# Patient Record
Sex: FEMALE | Race: WHITE | NOT HISPANIC OR LATINO | Employment: PART TIME | URBAN - METROPOLITAN AREA
[De-identification: names, ages, dates, MRNs, and addresses within clinical notes are randomized per-mention and may not be internally consistent; named-entity substitution may affect disease eponyms.]

---

## 2018-12-18 ENCOUNTER — HOSPITAL ENCOUNTER (EMERGENCY)
Facility: HOSPITAL | Age: 63
Discharge: HOME | End: 2018-12-19
Attending: EMERGENCY MEDICINE

## 2018-12-18 DIAGNOSIS — R55 SYNCOPE: ICD-10-CM

## 2018-12-18 DIAGNOSIS — S01.01XA LACERATION OF SCALP, INITIAL ENCOUNTER: Primary | ICD-10-CM

## 2018-12-18 LAB
ALBUMIN SERPL BCP-MCNC: 3.7 G/DL
ALP SERPL-CCNC: 96 U/L
ALT SERPL W/O P-5'-P-CCNC: 29 U/L
ANION GAP SERPL CALC-SCNC: 11 MMOL/L
AST SERPL-CCNC: 29 U/L
BASOPHILS # BLD AUTO: 0.05 K/UL
BASOPHILS NFR BLD: 0.8 %
BILIRUB SERPL-MCNC: 0.2 MG/DL
BUN SERPL-MCNC: 15 MG/DL
CALCIUM SERPL-MCNC: 9.6 MG/DL
CHLORIDE SERPL-SCNC: 109 MMOL/L
CO2 SERPL-SCNC: 22 MMOL/L
CREAT SERPL-MCNC: 0.7 MG/DL
DIFFERENTIAL METHOD: ABNORMAL
EOSINOPHIL # BLD AUTO: 0.1 K/UL
EOSINOPHIL NFR BLD: 1.3 %
ERYTHROCYTE [DISTWIDTH] IN BLOOD BY AUTOMATED COUNT: 14.8 %
EST. GFR  (AFRICAN AMERICAN): >60 ML/MIN/1.73 M^2
EST. GFR  (NON AFRICAN AMERICAN): >60 ML/MIN/1.73 M^2
GLUCOSE SERPL-MCNC: 107 MG/DL
HCT VFR BLD AUTO: 42.5 %
HGB BLD-MCNC: 13.3 G/DL
IMM GRANULOCYTES # BLD AUTO: 0.01 K/UL
IMM GRANULOCYTES NFR BLD AUTO: 0.2 %
LYMPHOCYTES # BLD AUTO: 1.9 K/UL
LYMPHOCYTES NFR BLD: 32.3 %
MCH RBC QN AUTO: 28.1 PG
MCHC RBC AUTO-ENTMCNC: 31.3 G/DL
MCV RBC AUTO: 90 FL
MONOCYTES # BLD AUTO: 0.4 K/UL
MONOCYTES NFR BLD: 7.3 %
NEUTROPHILS # BLD AUTO: 3.5 K/UL
NEUTROPHILS NFR BLD: 58.1 %
NRBC BLD-RTO: 0 /100 WBC
PLATELET # BLD AUTO: 294 K/UL
PMV BLD AUTO: 9.4 FL
POTASSIUM SERPL-SCNC: 4.1 MMOL/L
PROT SERPL-MCNC: 7.3 G/DL
RBC # BLD AUTO: 4.73 M/UL
SODIUM SERPL-SCNC: 142 MMOL/L
WBC # BLD AUTO: 6 K/UL

## 2018-12-18 PROCEDURE — 90715 TDAP VACCINE 7 YRS/> IM: CPT | Performed by: EMERGENCY MEDICINE

## 2018-12-18 PROCEDURE — 85025 COMPLETE CBC W/AUTO DIFF WBC: CPT

## 2018-12-18 PROCEDURE — 63600175 PHARM REV CODE 636 W HCPCS: Performed by: EMERGENCY MEDICINE

## 2018-12-18 PROCEDURE — 12031 INTMD RPR S/A/T/EXT 2.5 CM/<: CPT

## 2018-12-18 PROCEDURE — 12001 RPR S/N/AX/GEN/TRNK 2.5CM/<: CPT

## 2018-12-18 PROCEDURE — 12001 RPR S/N/AX/GEN/TRNK 2.5CM/<: CPT | Mod: ,,, | Performed by: EMERGENCY MEDICINE

## 2018-12-18 PROCEDURE — 93005 ELECTROCARDIOGRAM TRACING: CPT

## 2018-12-18 PROCEDURE — 90471 IMMUNIZATION ADMIN: CPT | Performed by: EMERGENCY MEDICINE

## 2018-12-18 PROCEDURE — 99284 EMERGENCY DEPT VISIT MOD MDM: CPT | Mod: 25,,, | Performed by: EMERGENCY MEDICINE

## 2018-12-18 PROCEDURE — 25000003 PHARM REV CODE 250: Performed by: EMERGENCY MEDICINE

## 2018-12-18 PROCEDURE — 99285 EMERGENCY DEPT VISIT HI MDM: CPT | Mod: 25

## 2018-12-18 PROCEDURE — 93010 ELECTROCARDIOGRAM REPORT: CPT | Mod: ,,, | Performed by: INTERNAL MEDICINE

## 2018-12-18 PROCEDURE — 80053 COMPREHEN METABOLIC PANEL: CPT

## 2018-12-18 RX ORDER — BUPROPION HYDROCHLORIDE 150 MG/1
150 TABLET ORAL DAILY
COMMUNITY
End: 2024-03-18 | Stop reason: ALTCHOICE

## 2018-12-18 RX ORDER — LIDOCAINE HYDROCHLORIDE AND EPINEPHRINE 10; 10 MG/ML; UG/ML
5 INJECTION, SOLUTION INFILTRATION; PERINEURAL ONCE
Status: COMPLETED | OUTPATIENT
Start: 2018-12-18 | End: 2018-12-18

## 2018-12-18 RX ORDER — BACITRACIN ZINC 500 UNIT/G
OINTMENT IN PACKET (EA) TOPICAL
Status: COMPLETED | OUTPATIENT
Start: 2018-12-18 | End: 2018-12-18

## 2018-12-18 RX ORDER — LITHIUM CARBONATE 300 MG/1
300 CAPSULE ORAL
COMMUNITY
End: 2024-03-18 | Stop reason: SDUPTHER

## 2018-12-18 RX ADMIN — BACITRACIN ZINC: 500 OINTMENT TOPICAL at 11:12

## 2018-12-18 RX ADMIN — LIDOCAINE HYDROCHLORIDE,EPINEPHRINE BITARTRATE 5 ML: 10; .01 INJECTION, SOLUTION INFILTRATION; PERINEURAL at 11:12

## 2018-12-18 RX ADMIN — CLOSTRIDIUM TETANI TOXOID ANTIGEN (FORMALDEHYDE INACTIVATED), CORYNEBACTERIUM DIPHTHERIAE TOXOID ANTIGEN (FORMALDEHYDE INACTIVATED), BORDETELLA PERTUSSIS TOXOID ANTIGEN (GLUTARALDEHYDE INACTIVATED), BORDETELLA PERTUSSIS FILAMENTOUS HEMAGGLUTININ ANTIGEN (FORMALDEHYDE INACTIVATED), BORDETELLA PERTUSSIS PERTACTIN ANTIGEN, AND BORDETELLA PERTUSSIS FIMBRIAE 2/3 ANTIGEN 0.5 ML: 5; 2; 2.5; 5; 3; 5 INJECTION, SUSPENSION INTRAMUSCULAR at 11:12

## 2018-12-19 VITALS
TEMPERATURE: 98 F | SYSTOLIC BLOOD PRESSURE: 137 MMHG | BODY MASS INDEX: 19.66 KG/M2 | DIASTOLIC BLOOD PRESSURE: 83 MMHG | OXYGEN SATURATION: 99 % | RESPIRATION RATE: 18 BRPM | WEIGHT: 118 LBS | HEIGHT: 65 IN | HEART RATE: 100 BPM
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
## 2018-12-19 NOTE — ED TRIAGE NOTES
Roselia Mendez, a 63 y.o. female presents to the ED w/ complaint of reports syncopal episode. Pt reports landing and hitting the back of her head. Pt reports she's unsure of LOC and doesn't remember exactly what happened. Pt denies blood thinner use, changes in vision, CP, SOB.    Triage note:  Chief Complaint   Patient presents with    Fall     pt reports syncopal episode tonight, pt hit back of head on ground, dry blood and hematoma noted to back of head, pt states that she doesn't remember what happen,denies taking blood thinners      Review of patient's allergies indicates:   Allergen Reactions    Promethazine      No past medical history on file.

## 2018-12-19 NOTE — ED NOTES
Patient identifiers verified and correct.  LOC: The patient is awake, alert and aware of environment with an appropriate affect, the patient is oriented x 3 and speaking appropriately.   APPEARANCE: Patient appears comfortable and in no acute distress, patient is clean and well groomed.  SKIN: The skin is warm and dry, color consistent with ethnicity, patient has normal skin turgor and WDL. Small Lac with dried blood noted to the back of the head r/t fall.   MUSCULOSKELETAL: Patient moving all extremities spontaneously, no swelling noted.  RESPIRATORY: Airway is open and patent, respirations are spontaneous, patient has a normal effort and rate, no accessory muscle use noted.  CARDIAC: Pt has normal R&R, cap refill <3 sec.  ABDOMEN: Pt denies any changes in BM. Abd WDL.  : Pt denies frequency or burning with urination.  NEURO: PERRL, opens eyes spontaneously, equal bilateral hand strength, follows commands, equal facial symmetry, normal sensation in all extremities when touched with a finger.

## 2018-12-19 NOTE — PROVIDER PROGRESS NOTES - EMERGENCY DEPT.
Encounter Date: 12/18/2018    ED Physician Progress Notes       SCRIBE NOTE: IJose, am scribing for, and in the presence of,  Ancelmo Lo MD.  Physician Statement: IAncelmo MD, personally performed the services described in this documentation as scribed by Jose Sharp in my presence, and it is both accurate and complete.      EKG - STEMI Decision  Initial Reading: No STEMI present.

## 2023-07-24 ENCOUNTER — HOSPITAL ENCOUNTER (EMERGENCY)
Facility: HOSPITAL | Age: 68
Discharge: HOME | End: 2023-07-24
Attending: EMERGENCY MEDICINE
Payer: MEDICARE

## 2023-07-24 VITALS
HEIGHT: 65 IN | DIASTOLIC BLOOD PRESSURE: 71 MMHG | SYSTOLIC BLOOD PRESSURE: 134 MMHG | TEMPERATURE: 98 F | BODY MASS INDEX: 19.66 KG/M2 | WEIGHT: 118 LBS | OXYGEN SATURATION: 98 % | RESPIRATION RATE: 16 BRPM | HEART RATE: 73 BPM

## 2023-07-24 DIAGNOSIS — R10.9 RIGHT FLANK PAIN: Primary | ICD-10-CM

## 2023-07-24 LAB
ALBUMIN SERPL BCP-MCNC: 3.6 G/DL (ref 3.5–5.2)
ALP SERPL-CCNC: 92 U/L (ref 55–135)
ALT SERPL W/O P-5'-P-CCNC: 26 U/L (ref 10–44)
ANION GAP SERPL CALC-SCNC: 11 MMOL/L (ref 8–16)
AST SERPL-CCNC: 19 U/L (ref 10–40)
BASOPHILS # BLD AUTO: 0.05 K/UL (ref 0–0.2)
BASOPHILS NFR BLD: 0.7 % (ref 0–1.9)
BILIRUB SERPL-MCNC: 0.4 MG/DL (ref 0.1–1)
BILIRUB UR QL STRIP: NEGATIVE
BUN SERPL-MCNC: 11 MG/DL (ref 8–23)
CALCIUM SERPL-MCNC: 9.9 MG/DL (ref 8.7–10.5)
CHLORIDE SERPL-SCNC: 106 MMOL/L (ref 95–110)
CLARITY UR REFRACT.AUTO: CLEAR
CO2 SERPL-SCNC: 25 MMOL/L (ref 23–29)
COLOR UR AUTO: YELLOW
CREAT SERPL-MCNC: 0.6 MG/DL (ref 0.5–1.4)
DIFFERENTIAL METHOD: NORMAL
EOSINOPHIL # BLD AUTO: 0.2 K/UL (ref 0–0.5)
EOSINOPHIL NFR BLD: 2.6 % (ref 0–8)
ERYTHROCYTE [DISTWIDTH] IN BLOOD BY AUTOMATED COUNT: 12.4 % (ref 11.5–14.5)
EST. GFR  (NO RACE VARIABLE): >60 ML/MIN/1.73 M^2
GLUCOSE SERPL-MCNC: 95 MG/DL (ref 70–110)
GLUCOSE UR QL STRIP: NEGATIVE
HCT VFR BLD AUTO: 42.3 % (ref 37–48.5)
HCV AB SERPL QL IA: NORMAL
HGB BLD-MCNC: 13.9 G/DL (ref 12–16)
HGB UR QL STRIP: NEGATIVE
HIV 1+2 AB+HIV1 P24 AG SERPL QL IA: NORMAL
IMM GRANULOCYTES # BLD AUTO: 0.02 K/UL (ref 0–0.04)
IMM GRANULOCYTES NFR BLD AUTO: 0.3 % (ref 0–0.5)
KETONES UR QL STRIP: NEGATIVE
LEUKOCYTE ESTERASE UR QL STRIP: NEGATIVE
LIPASE SERPL-CCNC: 30 U/L (ref 4–60)
LYMPHOCYTES # BLD AUTO: 1.9 K/UL (ref 1–4.8)
LYMPHOCYTES NFR BLD: 26.3 % (ref 18–48)
MCH RBC QN AUTO: 27.7 PG (ref 27–31)
MCHC RBC AUTO-ENTMCNC: 32.9 G/DL (ref 32–36)
MCV RBC AUTO: 84 FL (ref 82–98)
MONOCYTES # BLD AUTO: 0.6 K/UL (ref 0.3–1)
MONOCYTES NFR BLD: 8.5 % (ref 4–15)
NEUTROPHILS # BLD AUTO: 4.5 K/UL (ref 1.8–7.7)
NEUTROPHILS NFR BLD: 61.6 % (ref 38–73)
NITRITE UR QL STRIP: NEGATIVE
NRBC BLD-RTO: 0 /100 WBC
PH UR STRIP: 5 [PH] (ref 5–8)
PLATELET # BLD AUTO: 266 K/UL (ref 150–450)
PMV BLD AUTO: 10.4 FL (ref 9.2–12.9)
POTASSIUM SERPL-SCNC: 4 MMOL/L (ref 3.5–5.1)
PROT SERPL-MCNC: 6.8 G/DL (ref 6–8.4)
PROT UR QL STRIP: NEGATIVE
RBC # BLD AUTO: 5.01 M/UL (ref 4–5.4)
SODIUM SERPL-SCNC: 142 MMOL/L (ref 136–145)
SP GR UR STRIP: 1.01 (ref 1–1.03)
URN SPEC COLLECT METH UR: NORMAL
WBC # BLD AUTO: 7.3 K/UL (ref 3.9–12.7)

## 2023-07-24 PROCEDURE — 81003 URINALYSIS AUTO W/O SCOPE: CPT | Performed by: PHYSICIAN ASSISTANT

## 2023-07-24 PROCEDURE — 80053 COMPREHEN METABOLIC PANEL: CPT | Performed by: PHYSICIAN ASSISTANT

## 2023-07-24 PROCEDURE — 85025 COMPLETE CBC W/AUTO DIFF WBC: CPT | Performed by: PHYSICIAN ASSISTANT

## 2023-07-24 PROCEDURE — 25000003 PHARM REV CODE 250: Performed by: PHYSICIAN ASSISTANT

## 2023-07-24 PROCEDURE — 96360 HYDRATION IV INFUSION INIT: CPT

## 2023-07-24 PROCEDURE — 87389 HIV-1 AG W/HIV-1&-2 AB AG IA: CPT | Performed by: PHYSICIAN ASSISTANT

## 2023-07-24 PROCEDURE — 25500020 PHARM REV CODE 255: Performed by: EMERGENCY MEDICINE

## 2023-07-24 PROCEDURE — 83690 ASSAY OF LIPASE: CPT | Performed by: PHYSICIAN ASSISTANT

## 2023-07-24 PROCEDURE — 86803 HEPATITIS C AB TEST: CPT | Performed by: PHYSICIAN ASSISTANT

## 2023-07-24 PROCEDURE — 99285 EMERGENCY DEPT VISIT HI MDM: CPT | Mod: 25

## 2023-07-24 RX ORDER — ACETAMINOPHEN 500 MG
1000 TABLET ORAL
Status: COMPLETED | OUTPATIENT
Start: 2023-07-24 | End: 2023-07-24

## 2023-07-24 RX ADMIN — IOHEXOL 75 ML: 350 INJECTION, SOLUTION INTRAVENOUS at 02:07

## 2023-07-24 RX ADMIN — ACETAMINOPHEN 1000 MG: 500 TABLET ORAL at 01:07

## 2023-07-24 RX ADMIN — SODIUM CHLORIDE 1000 ML: 9 INJECTION, SOLUTION INTRAVENOUS at 01:07

## 2023-07-24 NOTE — ED PROVIDER NOTES
"Encounter Date: 2023       History     Chief Complaint   Patient presents with    Abdominal Pain     Pt c/o RLQ pain.  (+) nausea.  Onset Tuesday     68-year-old female with history of anxiety, depression presents to the ED complaining of right flank pain for the past 7 days.  Pain has been constant, progressively worsening since onset and significantly worse over the weekend.  Pain is 8/10 "dull" to the right flank and right upper abdomen, worse with movement or leaning over.  She has not taken any over-the-counter pain medication.  Reports associated nausea without vomiting, decreased appetite, lightheadedness.  No falls or direct trauma to the abdomen but has been lifting a 30# baby into the car.  Past surgical history significant for endometrial ablation,  x2.  Denies fever, chills, chest pain, shortness of breath, diarrhea, dysuria, rash.    The history is provided by the patient.   Review of patient's allergies indicates:   Allergen Reactions    Promethazine      Psychosis     Past Medical History:   Diagnosis Date    Anxiety     Bipolar 2 disorder     Cancer     Depression     Thyroid disease      Past Surgical History:   Procedure Laterality Date    thyriod      VASCULAR SURGERY       History reviewed. No pertinent family history.  Social History     Tobacco Use    Smoking status: Never    Smokeless tobacco: Never   Substance Use Topics    Alcohol use: Yes    Drug use: No     Review of Systems   Constitutional:  Negative for chills and fever.   Respiratory:  Negative for shortness of breath.    Cardiovascular:  Negative for chest pain.   Gastrointestinal:  Positive for abdominal pain and nausea. Negative for constipation, diarrhea and vomiting.   Genitourinary:  Positive for flank pain. Negative for dysuria and hematuria.   Musculoskeletal:  Positive for back pain.   Skin:  Negative for rash.   Neurological:  Negative for syncope, light-headedness and headaches.   Psychiatric/Behavioral:  " Negative for confusion.      Physical Exam     Initial Vitals [07/24/23 1026]   BP Pulse Resp Temp SpO2   (!) 140/81 78 14 97.7 °F (36.5 °C) 98 %      MAP       --         Physical Exam    Nursing note and vitals reviewed.  Constitutional: She appears well-developed and well-nourished. She is not diaphoretic. No distress.   HENT:   Head: Normocephalic and atraumatic.   Cardiovascular:  Normal rate, regular rhythm and normal heart sounds.     Exam reveals no gallop and no friction rub.       No murmur heard.  Pulmonary/Chest: Breath sounds normal. She has no wheezes. She has no rhonchi. She has no rales.   Abdominal: Abdomen is soft. Bowel sounds are normal. There is no abdominal tenderness.   No right CVA tenderness.  No left CVA tenderness. There is no rebound and no guarding.   Musculoskeletal:         General: Normal range of motion.     Neurological: She is alert and oriented to person, place, and time.   Skin: Skin is warm and dry. No rash noted.   Psychiatric: She has a normal mood and affect.       ED Course   Procedures  Labs Reviewed   HIV 1 / 2 ANTIBODY    Narrative:     Release to patient->Immediate   HEPATITIS C ANTIBODY    Narrative:     Release to patient->Immediate   CBC W/ AUTO DIFFERENTIAL   COMPREHENSIVE METABOLIC PANEL   LIPASE   URINALYSIS, REFLEX TO URINE CULTURE    Narrative:     Specimen Source->Urine          Imaging Results              CT Abdomen Pelvis With Contrast (Final result)  Result time 07/24/23 14:22:04      Final result by Renny Andrews MD (07/24/23 14:22:04)                   Impression:      1. There is slow flow through a few distal small bowel loops, finding is nonspecific.  This could be on the basis of developing colonic constipation however early enteritis is a consideration.  Correlation is advised.  2. Hepatomegaly noting suspected steatosis, correlation with LFTs recommended.  3. Please see above for additional findings.      Electronically signed by: Renny  MD Darryl  Date:    07/24/2023  Time:    14:22               Narrative:    EXAMINATION:  CT ABDOMEN PELVIS WITH CONTRAST    CLINICAL HISTORY:  Abdominal pain, acute, nonlocalized;    TECHNIQUE:  Low dose axial images, sagittal and coronal reformations were obtained from the lung bases to the pubic symphysis following the IV administration of 75 mL of Omnipaque 350 .  Oral contrast was not given.    COMPARISON:  None.    FINDINGS:  Images of the lower thorax are remarkable for bilateral dependent atelectasis.    The liver is mildly hypoattenuating, possibly reflecting steatosis, correlation with LFTs recommended.  Subcentimeter low attenuating lesions arise from the right hepatic lobe, too small for characterization.  The liver is enlarged.  The spleen, pancreas, gallbladder and right adrenal gland are grossly unremarkable.  There is subcentimeter thickening of the left adrenal gland.  There is a minimal hiatal hernia.  The stomach is decompressed without wall thickening.  The portal vein, splenic vein, SMV, celiac axis and SMA all are patent.  No significant abdominal lymphadenopathy.    The kidneys enhance symmetrically without hydronephrosis or nephrolithiasis.  There is a subcentimeter low attenuating lesion arising from the lower pole of the left kidney, too small for characterization.  The bilateral ureters are unremarkable without calculi seen.  The urinary bladder is unremarkable.  The uterus is unremarkable.  There are prominent venous structures within the adnexa bilaterally, correlation with any history of pelvic congestion syndrome.    There is moderate to large amount of stool throughout the colon.  The terminal ileum and appendix are unremarkable.  There are a few scattered fluid-filled upper limit of normal caliber mid to distal small bowel loops.  There are a few scattered shotty periaortic, pericaval, and mesenteric lymph nodes.  There is atherosclerotic calcification of the aorta and its  branches.  No focal organized pelvic fluid collection.    There are degenerative changes of the bilateral femoroacetabular joints, and spine.  There is grade 1 anterolisthesis of L4 on L5.  No significant inguinal lymphadenopathy.                                       Medications   acetaminophen tablet 1,000 mg (1,000 mg Oral Given 7/24/23 1300)   sodium chloride 0.9% bolus 1,000 mL 1,000 mL (0 mLs Intravenous Stopped 7/24/23 1402)   iohexoL (OMNIPAQUE 350) injection 75 mL (75 mLs Intravenous Given 7/24/23 1409)     Medical Decision Making:   History:   Old Medical Records: I decided to obtain old medical records.  Old Records Summarized: records from clinic visits and records from previous admission(s).  Clinical Tests:   Lab Tests: Reviewed and Ordered  Radiological Study: Ordered and Reviewed     APC / Resident Notes:   68-year-old female with history of anxiety, depression presents to the ED complaining of right flank pain for the past 7 days. VSS. Well appearing. RRR. Lungs clear. Abdomen soft, nontender.  No CVA tenderness.  No rashes.  Differential diagnosis includes but isn't limited to musculoskeletal pain, shingles prodrome, UTI, pyelonephritis, nephrolithiasis.  She does not have any abdominal tenderness and I do not suspect appendicitis, cholecystitis at this time.    UA with no infection. No leukocytosis or anemia. CMP unremarkable. Lipase normal. UA with no infection or hematuria.     CT abdomen/pelvis with no acute abnormalities. Possible developing constipation. Gallbladder, appendix unremarkable.     Pain likely MSK.     I do not feel that she needs any further labs or imaging at this time. Stable for discharge.     She was discharged without any new prescriptions.  Declined any prescription meds - will take tylenol, has lidoderm patches at home.  She will follow up with her PCP.  Strict ED return precautions given.  All of the patient's questions were answered.  I reviewed the patient's chart,  labs, and imaging.                   Clinical Impression:   Final diagnoses:  [R10.9] Right flank pain (Primary)        ED Disposition Condition    Discharge Stable          ED Prescriptions    None       Follow-up Information       Follow up With Specialties Details Why Contact Info    Julissa Meza MD Bariatrics   [address] [place] [address]  [phone]               Sandra Fragoso PA-C  07/24/23 1632

## 2023-07-24 NOTE — ED TRIAGE NOTES
Pt c/o right upper quadrant pain 8/10 that started Tuesday and has gotten worse over weekend. Pt endorses nausea and loss appetite. Pt denies bladder/bowel changes. Pt denies tenderness with pressure. Pt states that the pain worsens with movement and that she has been doing some heavy lifting.

## 2023-07-24 NOTE — DISCHARGE INSTRUCTIONS
Rest, no heavy lifting. Tylenol as needed for pain. Lidocaine patches or muscle cream. Heating pad (make sure to remove all muscle cream or lidocaine patch before placing heating pad)

## 2023-09-05 LAB — CRC RECOMMENDATION EXT: NORMAL

## 2024-03-04 ENCOUNTER — TELEPHONE (OUTPATIENT)
Dept: INTERNAL MEDICINE | Facility: CLINIC | Age: 69
End: 2024-03-04
Payer: MEDICARE

## 2024-03-04 ENCOUNTER — OFFICE VISIT (OUTPATIENT)
Dept: CARDIOLOGY | Facility: CLINIC | Age: 69
End: 2024-03-04
Payer: MEDICARE

## 2024-03-04 VITALS
HEIGHT: 65 IN | BODY MASS INDEX: 20.03 KG/M2 | SYSTOLIC BLOOD PRESSURE: 144 MMHG | DIASTOLIC BLOOD PRESSURE: 72 MMHG | HEART RATE: 92 BPM | WEIGHT: 120.25 LBS

## 2024-03-04 DIAGNOSIS — R06.02 SHORTNESS OF BREATH: Primary | ICD-10-CM

## 2024-03-04 DIAGNOSIS — E78.2 MIXED HYPERLIPIDEMIA: ICD-10-CM

## 2024-03-04 DIAGNOSIS — R00.2 PALPITATIONS: ICD-10-CM

## 2024-03-04 PROCEDURE — 99204 OFFICE O/P NEW MOD 45 MIN: CPT | Mod: S$PBB,,, | Performed by: INTERNAL MEDICINE

## 2024-03-04 PROCEDURE — 99213 OFFICE O/P EST LOW 20 MIN: CPT | Mod: PBBFAC,PO | Performed by: INTERNAL MEDICINE

## 2024-03-04 PROCEDURE — 99999 PR PBB SHADOW E&M-EST. PATIENT-LVL III: CPT | Mod: PBBFAC,,, | Performed by: INTERNAL MEDICINE

## 2024-03-04 RX ORDER — SULFAMETHOXAZOLE AND TRIMETHOPRIM 800; 160 MG/1; MG/1
TABLET ORAL
COMMUNITY
Start: 2024-02-25 | End: 2024-03-18

## 2024-03-04 NOTE — TELEPHONE ENCOUNTER
----- Message from Zaira Angelo sent at 3/4/2024  4:04 PM CST -----  Regarding: Mixed hyperlipidemia  Mixed hyperlipidemia    3/4/24-Pt has a referral and need assistance in scheduling. Please contact pt for assistance in scheduling. Contact pt at [phone].

## 2024-03-05 ENCOUNTER — HOSPITAL ENCOUNTER (OUTPATIENT)
Dept: CARDIOLOGY | Facility: HOSPITAL | Age: 69
Discharge: HOME | End: 2024-03-05
Attending: INTERNAL MEDICINE
Payer: MEDICARE

## 2024-03-05 VITALS
WEIGHT: 118 LBS | HEART RATE: 75 BPM | DIASTOLIC BLOOD PRESSURE: 75 MMHG | SYSTOLIC BLOOD PRESSURE: 133 MMHG | BODY MASS INDEX: 19.66 KG/M2 | HEIGHT: 65 IN

## 2024-03-05 DIAGNOSIS — R06.02 SHORTNESS OF BREATH: ICD-10-CM

## 2024-03-05 DIAGNOSIS — R00.2 PALPITATIONS: ICD-10-CM

## 2024-03-05 LAB
ASCENDING AORTA: 3.05 CM
BSA FOR ECHO PROCEDURE: 1.57 M2
CV ECHO LV RWT: 0.37 CM
CV STRESS BASE HR: 75 BPM
DIASTOLIC BLOOD PRESSURE: 75 MMHG
DOP CALC LVOT AREA: 3.5 CM2
DOP CALC LVOT DIAMETER: 2.12 CM
DOP CALC LVOT PEAK VEL: 0.93 M/S
DOP CALC LVOT STROKE VOLUME: 63.65 CM3
DOP CALCLVOT PEAK VEL VTI: 18.04 CM
E WAVE DECELERATION TIME: 133.23 MSEC
E/A RATIO: 0.81
E/E' RATIO: 6.33 M/S
ECHO LV POSTERIOR WALL: 0.75 CM (ref 0.6–1.1)
EJECTION FRACTION: 60 %
FRACTIONAL SHORTENING: 30 % (ref 28–44)
INTERVENTRICULAR SEPTUM: 0.85 CM (ref 0.6–1.1)
IVRT: 99.9 MSEC
LA MAJOR: 4.73 CM
LA MINOR: 5.02 CM
LA WIDTH: 3.88 CM
LEFT ATRIUM SIZE: 3.42 CM
LEFT ATRIUM VOLUME INDEX MOD: 28.9 ML/M2
LEFT ATRIUM VOLUME INDEX: 34.8 ML/M2
LEFT ATRIUM VOLUME MOD: 45.74 CM3
LEFT ATRIUM VOLUME: 54.94 CM3
LEFT INTERNAL DIMENSION IN SYSTOLE: 2.87 CM (ref 2.1–4)
LEFT VENTRICLE DIASTOLIC VOLUME INDEX: 46.51 ML/M2
LEFT VENTRICLE DIASTOLIC VOLUME: 73.48 ML
LEFT VENTRICLE MASS INDEX: 61 G/M2
LEFT VENTRICLE SYSTOLIC VOLUME INDEX: 19.9 ML/M2
LEFT VENTRICLE SYSTOLIC VOLUME: 31.37 ML
LEFT VENTRICULAR INTERNAL DIMENSION IN DIASTOLE: 4.08 CM (ref 3.5–6)
LEFT VENTRICULAR MASS: 96.56 G
LV LATERAL E/E' RATIO: 5.7 M/S
LV SEPTAL E/E' RATIO: 7.13 M/S
MV PEAK A VEL: 0.7 M/S
MV PEAK E VEL: 0.57 M/S
MV STENOSIS PRESSURE HALF TIME: 38.64 MS
MV VALVE AREA P 1/2 METHOD: 5.69 CM2
OHS CV CPX 1 MINUTE RECOVERY HEART RATE: 114 BPM
OHS CV CPX 85 PERCENT MAX PREDICTED HEART RATE MALE: 131
OHS CV CPX ESTIMATED METS: 9
OHS CV CPX MAX PREDICTED HEART RATE: 154
OHS CV CPX PATIENT IS FEMALE: 1
OHS CV CPX PATIENT IS MALE: 0
OHS CV CPX PEAK DIASTOLIC BLOOD PRESSURE: 77 MMHG
OHS CV CPX PEAK HEAR RATE: 144 BPM
OHS CV CPX PEAK RATE PRESSURE PRODUCT: NORMAL
OHS CV CPX PEAK SYSTOLIC BLOOD PRESSURE: 157 MMHG
OHS CV CPX PERCENT MAX PREDICTED HEART RATE ACHIEVED: 97
OHS CV CPX RATE PRESSURE PRODUCT PRESENTING: 9975
PISA TR MAX VEL: 2.62 M/S
RA MAJOR: 3.65 CM
RA PRESSURE ESTIMATED: 3 MMHG
RA WIDTH: 2.82 CM
RIGHT VENTRICULAR END-DIASTOLIC DIMENSION: 2.64 CM
RV TB RVSP: 6 MMHG
SINUS: 2.91 CM
STJ: 2.69 CM
STRESS ECHO POST EXERCISE DUR MIN: 5 MINUTES
STRESS ECHO POST EXERCISE DUR SEC: 52 SECONDS
SYSTOLIC BLOOD PRESSURE: 133 MMHG
TDI LATERAL: 0.1 M/S
TDI SEPTAL: 0.08 M/S
TDI: 0.09 M/S
TR MAX PG: 27 MMHG
TRICUSPID ANNULAR PLANE SYSTOLIC EXCURSION: 2.73 CM
TV REST PULMONARY ARTERY PRESSURE: 30 MMHG
Z-SCORE OF LEFT VENTRICULAR DIMENSION IN END DIASTOLE: -1.04
Z-SCORE OF LEFT VENTRICULAR DIMENSION IN END SYSTOLE: 0.18

## 2024-03-05 PROCEDURE — 93351 STRESS TTE COMPLETE: CPT | Mod: 26,,, | Performed by: INTERNAL MEDICINE

## 2024-03-05 PROCEDURE — 93351 STRESS TTE COMPLETE: CPT | Mod: PO

## 2024-03-06 DIAGNOSIS — Z78.0 MENOPAUSE: ICD-10-CM

## 2024-03-06 NOTE — PROGRESS NOTES
"Subjective:   Chief Complaint: Tachycardia  Last Clinic Visit: New Patient    History of Present Illness: Roselia Mendez is a 68 y.o. , major depressive disorder, palpitations, who presents to establish cardiology care.  She reports a longstanding history of hypothyroidism and has been on a stable dose of Girard thyroid for many years, and denies any feelings of hyper or hypothyroidism.  Stable dose of lithium as well for 35 years working well.  She has developed intermittent palpitations as well as dyspnea on exertion over the course the past several weeks to months.  Occasional lightheadedness presyncope, no actual syncope.  No personal history of any cardiovascular disease, no family history of significant cardiovascular disease, denies any chest pain with exertion is reasonably active working in her yard, but does become out of breath easy.  She has had issues with her heart racing in the past, and has worsened over the past several months, describes an irregularity, and occasional heart pounding sensation, some positional nature as well.    Dx:  Hypothyroidism  Major depression disorder   Palpitations  Dyspnea on exertion     Medications:  Outpatient Encounter Medications as of 3/4/2024   Medication Sig Dispense Refill    ARMOUR THYROID 60 mg Tab Take by mouth.      buPROPion (WELLBUTRIN XL) 150 MG TB24 tablet Take 150 mg by mouth once daily.      lithium (ESKALITH) 300 MG capsule Take 300 mg by mouth 3 (three) times daily with meals.       No facility-administered encounter medications on file as of 3/4/2024.     Social History:  Roselia reports that she has never smoked. She has never used smokeless tobacco. She reports current alcohol use. She reports that she does not use drugs.    Objective:   BP (!) 144/72   Pulse 92   Ht 5' 5" (1.651 m)   Wt 54.5 kg (120 lb 4.2 oz)   BMI 20.01 kg/m²     Physical Exam   HENT:   Head: Normocephalic and atraumatic.   Mouth/Throat: Mucous membranes are moist. "   Cardiovascular: Normal rate, regular rhythm and normal pulses. Exam reveals no gallop and no friction rub.   No murmur heard.  Pulmonary/Chest: Effort normal and breath sounds normal. No stridor. No respiratory distress. She has no rhonchi. She has no rales. She exhibits no tenderness.   Abdominal: Normal appearance. She exhibits no distension.   Musculoskeletal:      Right lower leg: No edema.      Left lower leg: No edema.   Neurological: She is alert.   Skin: Skin is warm. Capillary refill takes less than 2 seconds.      EKG:  My independent visualization of most recent EKG is sinus tachycardia    TTE:  Stress echo pending    Lipids:  Recent Labs   Lab 03/05/24  0911   LDL Cholesterol 79.6   HDL 81 H      Renal:  Recent Labs   Lab 03/05/24  0911   Creatinine 0.6   Potassium 4.4   CO2 24   BUN 12     Liver:  Recent Labs   Lab 03/05/24  0911   AST 22   ALT 22     Assessment:     1. Shortness of breath    2. Palpitations    3. Mixed hyperlipidemia      Plan:   1. Shortness of breath  Does not have too many risk factors, but with new onset dyspnea on exertion, will rule out any obstructive coronary artery disease, as well as obtain baseline assessment of and function to look for any obvious cause of shortness of breath.  Will screen for any significant anemia, renal, or hepatic disease which could be contributing.  Will also update her TSH has been within normal limits in the past and she denies any feelings of being off, but will obtain baseline labs.  - Stress Echo Which stress agent will be used? Treadmill Exercise; Color Flow Doppler? No; Future  - TSH; Future  - CBC Auto Differential; Future  - Comprehensive Metabolic Panel; Future    2. Palpitations  Unclear whether these represent sinus palpitations, versus any potential arrhythmia, discussed different monitoring techniques and strategies, no alarm signs, at this time will just start with a stress echocardiogram, and if symptoms persistent can consider  monitoring at that time  - Stress Echo Which stress agent will be used? Treadmill Exercise; Color Flow Doppler? No; Future  - TSH; Future  - CBC Auto Differential; Future  - Comprehensive Metabolic Panel; Future    3. Mixed hyperlipidemia  Will update lipid panel to ensure that she does not have any significant hyperlipidemia  - Lipid Panel; Future  - Ambulatory referral/consult to Internal Medicine; Future    Follow up in 3-6 months.      Riky Hilliard MD [facility]

## 2024-03-12 ENCOUNTER — TELEPHONE (OUTPATIENT)
Dept: CARDIOLOGY | Facility: CLINIC | Age: 69
End: 2024-03-12
Payer: MEDICARE

## 2024-03-12 DIAGNOSIS — R06.02 SHORTNESS OF BREATH: Primary | ICD-10-CM

## 2024-03-12 NOTE — TELEPHONE ENCOUNTER
Called patient, discussed results of test showing very good lipid panel, normal CMP, CBC, does have low TSH and normal free T4, she already has plans to follow up with the endocrinologist later this week.    Stress echocardiogram with decent exercise capacity, good heart rate, normal EKG, but possible stress wall motion abnormalities in LAD territory.  She denies any anginal symptoms and LV function within normal limits without baseline wall motion abnormalities.    Discussed this, and she was amenable with proceeding with more definitive testing, will plan on PET stress test    -Riky Hilliard

## 2024-03-18 ENCOUNTER — PATIENT OUTREACH (OUTPATIENT)
Dept: ADMINISTRATIVE | Facility: HOSPITAL | Age: 69
End: 2024-03-18
Payer: MEDICARE

## 2024-03-18 ENCOUNTER — OFFICE VISIT (OUTPATIENT)
Dept: INTERNAL MEDICINE | Facility: CLINIC | Age: 69
End: 2024-03-18
Payer: MEDICARE

## 2024-03-18 VITALS
BODY MASS INDEX: 19.91 KG/M2 | WEIGHT: 119.5 LBS | OXYGEN SATURATION: 98 % | DIASTOLIC BLOOD PRESSURE: 88 MMHG | HEIGHT: 65 IN | SYSTOLIC BLOOD PRESSURE: 138 MMHG | HEART RATE: 90 BPM

## 2024-03-18 DIAGNOSIS — E89.0 POSTABLATIVE HYPOTHYROIDISM: Primary | ICD-10-CM

## 2024-03-18 DIAGNOSIS — E78.2 MODERATE MIXED HYPERLIPIDEMIA NOT REQUIRING STATIN THERAPY: ICD-10-CM

## 2024-03-18 DIAGNOSIS — F31.78 BIPOLAR DISORDER, IN FULL REMISSION, MOST RECENT EPISODE MIXED: ICD-10-CM

## 2024-03-18 PROBLEM — F31.70 BIPOLAR DISORDER IN FULL REMISSION: Status: ACTIVE | Noted: 2024-03-18

## 2024-03-18 PROCEDURE — 99204 OFFICE O/P NEW MOD 45 MIN: CPT | Mod: S$PBB,,, | Performed by: INTERNAL MEDICINE

## 2024-03-18 PROCEDURE — 99214 OFFICE O/P EST MOD 30 MIN: CPT | Mod: PBBFAC | Performed by: INTERNAL MEDICINE

## 2024-03-18 PROCEDURE — 99999 PR PBB SHADOW E&M-EST. PATIENT-LVL IV: CPT | Mod: PBBFAC,,, | Performed by: INTERNAL MEDICINE

## 2024-03-18 RX ORDER — LEVOTHYROXINE SODIUM 88 UG/1
88 TABLET ORAL
Qty: 30 TABLET | Refills: 11 | Status: SHIPPED | OUTPATIENT
Start: 2024-03-18 | End: 2025-03-18

## 2024-03-18 RX ORDER — BUPROPION HYDROCHLORIDE 150 MG/1
150 TABLET, EXTENDED RELEASE ORAL 2 TIMES DAILY
Qty: 180 TABLET | Refills: 3 | Status: SHIPPED | OUTPATIENT
Start: 2024-03-18 | End: 2025-03-18

## 2024-03-18 RX ORDER — LIOTHYRONINE SODIUM 5 UG/1
5 TABLET ORAL 2 TIMES DAILY
Qty: 60 TABLET | Refills: 11 | Status: SHIPPED | OUTPATIENT
Start: 2024-03-18 | End: 2025-03-18

## 2024-03-18 RX ORDER — LITHIUM CARBONATE 300 MG/1
300 CAPSULE ORAL 2 TIMES DAILY
Qty: 180 CAPSULE | Refills: 3 | Status: SHIPPED | OUTPATIENT
Start: 2024-03-18 | End: 2025-03-13

## 2024-03-18 NOTE — PROGRESS NOTES
"    CHIEF COMPLAINT     Chief Complaint   Patient presents with    [facility]       HPI     Roselia Mendez is a 68 y.o. female neurogenic hypothyroidism status post ablation and bipolar disorder on lithium here today for new patient evaluation.    Previously followed by Dr. Crespo    Hypothyroidism  Status post ablation for abnormal thyroid globus.  She has been on [place] thyroid 60 mcg is that time.  She is wanting to transition back to levothyroxine Cytomel due to availability of [place] thyroid.    Bipolar disorder  Has been on lithium 300 b.i.d. and Wellbutrin  mg b.i.d..  Regimen to stable as not had lithium levels checked in a long period of time.  No known adverse effects from the lithium.    Personally Reviewed Patient's Medical, surgical, family and social hx. Changes updated in [facility].  Care Team updated in Epic    Review of Systems:  Review of Systems   Respiratory:  Negative for cough and shortness of breath.    Gastrointestinal:  Negative for constipation.   Endocrine: Negative for cold intolerance and heat intolerance.       Health Maintenance:   Reviewed with patient  Due for the following:      PHYSICAL EXAM     /88   Pulse 90   Ht 5' 5" (1.651 m)   Wt 54.2 kg (119 lb 7.8 oz)   SpO2 98%   BMI 19.88 kg/m²     Gen: Well Appearing, NAD  HEENT: PERR, EOMI  Neck: FROM, no thyromegaly, no cervical adenopathy  CVD: RRR, no M/R/G  Pulm: Normal work of breathing, CTAB, no wheezing  Abd:  Soft, NT, ND non TTP, no mass  MSK: no LE edema  Neuro: A&Ox3, gait normal, speech normal  Mood; Mood normal, behavior normal, thought process linear       LABS     Labs reviewed; Notable for    Chemistry        Component Value Date/Time     03/05/2024 0911    K 4.4 03/05/2024 0911     03/05/2024 0911    CO2 24 03/05/2024 0911    BUN 12 03/05/2024 0911    CREATININE 0.6 03/05/2024 0911     03/05/2024 0911        Component Value Date/Time    CALCIUM 9.9 03/05/2024 0911    ALKPHOS 84 " 03/05/2024 0911    AST 22 03/05/2024 0911    ALT 22 03/05/2024 0911    BILITOT 0.4 03/05/2024 0911    ESTGFRAFRICA >60.0 12/18/2018 2321    EGFRNONAA >60.0 12/18/2018 2321        Lab Results   Component Value Date    TSH <0.010 (L) 03/05/2024     .  ASSESSMENT     1. Postablative hypothyroidism  levothyroxine (SYNTHROID) 88 MCG tablet    liothyronine (CYTOMEL) 5 MCG Tab    TSH    T4, FREE      2. Bipolar disorder, in full remission, most recent episode mixed  lithium (ESKALITH) 300 MG capsule    buPROPion (WELLBUTRIN SR) 150 MG TBSR 12 hr tablet    LITHIUM LEVEL      3. Moderate mixed hyperlipidemia not requiring statin therapy  Ambulatory referral/consult to Internal Medicine              Plan     Roselia Mendez is a 68 y.o. female with neurogenic hypothyroidism status post ablation and bipolar disorder on lithium    1. Postablative hypothyroidism  Status post ablation.  Currently over treating hypothyroidism  Will start 88 mcg of levothyroxine with Cytomel 5 mcg twice a day  Recheck levels in 8 weeks.  Consider holding cytomel if unable to get TSH into therapeutic range    2. Bipolar disorder, in full remission, most recent episode mixed  Refilled lithium and Wellbutrin  Will check lithium levels in 8 weeks    3. Mixed hyperlipidemia  Continue to monitor.  LDL less than 100 without therapy  - Ambulatory referral/consult to Internal Medicine          Nicho Wilkinson MD

## 2024-03-18 NOTE — PROGRESS NOTES
Health Maintenance Due   Topic Date Due    DEXA Scan  Never done    Colorectal Cancer Screening  Never done    RSV Vaccine (Age 60+ and Pregnant patients) (1 - 1-dose 60+ series) Never done    Shingles Vaccine (2 of 2) 05/25/2022       Chart reviewed and updated. Requested colonoscopy record from Vick BEASLEY.    Radha Enciso LPN   Clinical Care Coordinator  Primary Care and Wellness

## 2024-03-18 NOTE — LETTER
AUTHORIZATION FOR RELEASE OF   CONFIDENTIAL INFORMATION    Dear Vick BEASLEY,    We are seeing Roselia Mendez, date of birth 1955, in the clinic at [facility]. Nicho Wilkinson MD is the patient's PCP. Roselia Mendez has an outstanding lab/procedure at the time we reviewed her chart. In order to help keep her health information updated, she has authorized us to request the following medical record(s):        (  )  MAMMOGRAM                                      ( x )  COLONOSCOPY      (  )  PAP SMEAR                                          (  )  OUTSIDE LAB RESULTS     (  )  DEXA SCAN                                          (  )  EYE EXAM            (  )  FOOT EXAM                                          (  )  ENTIRE RECORD     (  )  OUTSIDE IMMUNIZATIONS                 (  )  _______________         Please fax records to [facility], [place], Samuel T., MD, [phone]     If you have any questions, please contact Radha Enciso LPN at [phone].           Patient Name: Roselia Mendez  : 1955  Patient Phone #: [phone]

## 2024-03-27 ENCOUNTER — PATIENT OUTREACH (OUTPATIENT)
Dept: ADMINISTRATIVE | Facility: HOSPITAL | Age: 69
End: 2024-03-27
Payer: MEDICARE

## 2024-03-27 NOTE — PROGRESS NOTES
Health Maintenance Due   Topic Date Due    DEXA Scan  Never done    RSV Vaccine (Age 60+ and Pregnant patients) (1 - 1-dose 60+ series) Never done    Shingles Vaccine (2 of 2) 05/25/2022       Chart reviewed and updated. Colonoscopy and pathology reports uploaded and sent to PCP.    Radha Enciso LPN   Clinical Care Coordinator  Primary Care and Wellness

## 2024-04-30 ENCOUNTER — OFFICE VISIT (OUTPATIENT)
Dept: PODIATRY | Facility: CLINIC | Age: 69
End: 2024-04-30
Payer: MEDICARE

## 2024-04-30 VITALS
HEIGHT: 65 IN | TEMPERATURE: 98 F | HEART RATE: 86 BPM | WEIGHT: 119.5 LBS | BODY MASS INDEX: 19.91 KG/M2 | SYSTOLIC BLOOD PRESSURE: 130 MMHG | DIASTOLIC BLOOD PRESSURE: 84 MMHG

## 2024-04-30 DIAGNOSIS — L60.9 NAIL ABNORMALITY: ICD-10-CM

## 2024-04-30 DIAGNOSIS — M79.675 PAIN IN TOE OF LEFT FOOT: ICD-10-CM

## 2024-04-30 DIAGNOSIS — L60.0 INGROWN NAIL: ICD-10-CM

## 2024-04-30 DIAGNOSIS — B35.3 TINEA PEDIS OF BOTH FEET: Primary | ICD-10-CM

## 2024-04-30 PROCEDURE — 99999 PR PBB SHADOW E&M-EST. PATIENT-LVL III: CPT | Mod: PBBFAC,,, | Performed by: STUDENT IN AN ORGANIZED HEALTH CARE EDUCATION/TRAINING PROGRAM

## 2024-04-30 PROCEDURE — 99204 OFFICE O/P NEW MOD 45 MIN: CPT | Mod: S$PBB,,, | Performed by: STUDENT IN AN ORGANIZED HEALTH CARE EDUCATION/TRAINING PROGRAM

## 2024-04-30 PROCEDURE — 99213 OFFICE O/P EST LOW 20 MIN: CPT | Mod: PBBFAC | Performed by: STUDENT IN AN ORGANIZED HEALTH CARE EDUCATION/TRAINING PROGRAM

## 2024-04-30 RX ORDER — KETOCONAZOLE 20 MG/G
CREAM TOPICAL DAILY
Qty: 60 G | Refills: 2 | Status: SHIPPED | OUTPATIENT
Start: 2024-04-30

## 2024-04-30 NOTE — PROGRESS NOTES
Subjective:     Patient    Roselia Mendez is a 68 y.o. female.    Problem    Presents for left 5th toe pain which has been present for months. Believes that it is partially due to the 4th toe crowding the 5th toe but also because the 5th toenail is abnormal. Has attempted OTC treatments without improvement. Has had prior bunion surgeries on both feet decades ago, the bunions have recurred. Denies numbness, tingling, burning.      History    History obtained from patient and review of medical records.     Past Medical History:   Diagnosis Date    Anxiety     Bipolar 2 disorder     Cancer     Depression     Thyroid disease        Past Surgical History:   Procedure Laterality Date    thyriod      VASCULAR SURGERY          Objective:     Vitals  Wt Readings from Last 1 Encounters:   04/30/24 54.2 kg (119 lb 7.8 oz)     Temp Readings from Last 1 Encounters:   04/30/24 98.1 °F (36.7 °C) (Oral)     BP Readings from Last 1 Encounters:   04/30/24 130/84     Pulse Readings from Last 1 Encounters:   04/30/24 86       Dermatological Exam    Skin:  Pedal hair growth, skin color, and skin texture normal on right; dry scaly skin  Pedal hair growth, skin color, and skin texture normal on left; dry scaly skin    Nails:  Left 5th nail(s) ingrown along medial border; lulu corn at left 5th toe medial border; nail and lulu corn painful    Vascular Exam    Arteries:  Posterior tibial artery palpable on right  Dorsalis pedis artery palpable on right  Posterior tibial artery palpable on left  Dorsalis pedis artery palpable on left    Veins:  Superficial veins unremarkable on right  Superficial veins unremarkable on left    Swelling:  None on right  None on left    Neurological Exam    [place] touch test:  6/6 sites sensed, light touch intact     Musculoskeletal Exam    Footwear:  Casual on right  Casual on left    Gait Exam:   Ambulatory Status: Ambulatory  Gait: Normal  Assistive Devices: None    Foot Progression Angle:  Normal  on right  Normal on left    Right Lower Extremity Additional Findings:  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    Imaging and Other Tests    Imaging:  Independently reviewed and interpreted imaging, findings are as follows: N/A     Assessment:     Encounter Diagnoses   Name Primary?    Tinea pedis of both feet Yes    Nail abnormality     Ingrown nail     Pain in toe of left foot         Plan:     I counseled the patient on her conditions, their implications and medical management.    Tinea pedis: chronic stable  -Ketoconazole cream daily until cleared.     Left 5th toenail medial border abnormality (lulu corn), painful, ingrown: chronic stable  -Recommended permanent avulsion left 5th toenail medial border. Reviewed potential risks, benefits, alternatives. Patient amenable. Will schedule out.       Return to clinic in 1-3 weeks for procedure, call sooner PRN.

## 2024-05-17 ENCOUNTER — PROCEDURE VISIT (OUTPATIENT)
Dept: PODIATRY | Facility: CLINIC | Age: 69
End: 2024-05-17
Payer: MEDICARE

## 2024-05-17 ENCOUNTER — HOSPITAL ENCOUNTER (EMERGENCY)
Facility: HOSPITAL | Age: 69
Discharge: HOME | End: 2024-05-17
Attending: EMERGENCY MEDICINE
Payer: MEDICARE

## 2024-05-17 VITALS
DIASTOLIC BLOOD PRESSURE: 69 MMHG | RESPIRATION RATE: 16 BRPM | TEMPERATURE: 98 F | OXYGEN SATURATION: 97 % | SYSTOLIC BLOOD PRESSURE: 136 MMHG | HEART RATE: 104 BPM

## 2024-05-17 VITALS
WEIGHT: 119.5 LBS | SYSTOLIC BLOOD PRESSURE: 128 MMHG | HEIGHT: 65 IN | BODY MASS INDEX: 19.91 KG/M2 | TEMPERATURE: 98 F | RESPIRATION RATE: 16 BRPM | HEART RATE: 55 BPM | DIASTOLIC BLOOD PRESSURE: 87 MMHG | OXYGEN SATURATION: 100 %

## 2024-05-17 DIAGNOSIS — M79.672 FOOT PAIN, LEFT: ICD-10-CM

## 2024-05-17 DIAGNOSIS — T42.4X1A XANAX OVERDOSE: Primary | ICD-10-CM

## 2024-05-17 DIAGNOSIS — T50.901A ACCIDENTAL DRUG OVERDOSE, INITIAL ENCOUNTER: Primary | ICD-10-CM

## 2024-05-17 LAB
ALBUMIN SERPL BCP-MCNC: 3.7 G/DL (ref 3.5–5.2)
ALP SERPL-CCNC: 89 U/L (ref 55–135)
ALT SERPL W/O P-5'-P-CCNC: 29 U/L (ref 10–44)
AMPHET+METHAMPHET UR QL: NEGATIVE
ANION GAP SERPL CALC-SCNC: 8 MMOL/L (ref 8–16)
APAP SERPL-MCNC: <3 UG/ML (ref 10–20)
AST SERPL-CCNC: 26 U/L (ref 10–40)
BARBITURATES UR QL SCN>200 NG/ML: NEGATIVE
BASOPHILS # BLD AUTO: 0.04 K/UL (ref 0–0.2)
BASOPHILS # BLD AUTO: 0.04 K/UL (ref 0–0.2)
BASOPHILS NFR BLD: 0.6 % (ref 0–1.9)
BASOPHILS NFR BLD: 0.6 % (ref 0–1.9)
BENZODIAZ UR QL SCN>200 NG/ML: ABNORMAL
BILIRUB SERPL-MCNC: 0.4 MG/DL (ref 0.1–1)
BILIRUB UR QL STRIP: NEGATIVE
BUN SERPL-MCNC: 10 MG/DL (ref 8–23)
BZE UR QL SCN: NEGATIVE
CALCIUM SERPL-MCNC: 9.9 MG/DL (ref 8.7–10.5)
CANNABINOIDS UR QL SCN: ABNORMAL
CHLORIDE SERPL-SCNC: 108 MMOL/L (ref 95–110)
CLARITY UR REFRACT.AUTO: CLEAR
CO2 SERPL-SCNC: 24 MMOL/L (ref 23–29)
COLOR UR AUTO: YELLOW
CREAT SERPL-MCNC: 0.6 MG/DL (ref 0.5–1.4)
CREAT UR-MCNC: 43 MG/DL (ref 15–325)
DIFFERENTIAL METHOD BLD: NORMAL
DIFFERENTIAL METHOD BLD: NORMAL
EOSINOPHIL # BLD AUTO: 0.1 K/UL (ref 0–0.5)
EOSINOPHIL # BLD AUTO: 0.1 K/UL (ref 0–0.5)
EOSINOPHIL NFR BLD: 2.3 % (ref 0–8)
EOSINOPHIL NFR BLD: 2.3 % (ref 0–8)
ERYTHROCYTE [DISTWIDTH] IN BLOOD BY AUTOMATED COUNT: 12.5 % (ref 11.5–14.5)
ERYTHROCYTE [DISTWIDTH] IN BLOOD BY AUTOMATED COUNT: 12.5 % (ref 11.5–14.5)
EST. GFR  (NO RACE VARIABLE): >60 ML/MIN/1.73 M^2
ETHANOL SERPL-MCNC: <10 MG/DL
GLUCOSE SERPL-MCNC: 90 MG/DL (ref 70–110)
GLUCOSE UR QL STRIP: NEGATIVE
HCT VFR BLD AUTO: 38.4 % (ref 37–48.5)
HCT VFR BLD AUTO: 38.4 % (ref 37–48.5)
HGB BLD-MCNC: 12.9 G/DL (ref 12–16)
HGB BLD-MCNC: 12.9 G/DL (ref 12–16)
HGB UR QL STRIP: NEGATIVE
IMM GRANULOCYTES # BLD AUTO: 0.01 K/UL (ref 0–0.04)
IMM GRANULOCYTES # BLD AUTO: 0.01 K/UL (ref 0–0.04)
IMM GRANULOCYTES NFR BLD AUTO: 0.2 % (ref 0–0.5)
IMM GRANULOCYTES NFR BLD AUTO: 0.2 % (ref 0–0.5)
KETONES UR QL STRIP: NEGATIVE
LEUKOCYTE ESTERASE UR QL STRIP: ABNORMAL
LITHIUM SERPL-SCNC: 0.3 MMOL/L (ref 0.6–1.2)
LITHIUM SERPL-SCNC: 0.3 MMOL/L (ref 0.6–1.2)
LYMPHOCYTES # BLD AUTO: 1.7 K/UL (ref 1–4.8)
LYMPHOCYTES # BLD AUTO: 1.7 K/UL (ref 1–4.8)
LYMPHOCYTES NFR BLD: 27.7 % (ref 18–48)
LYMPHOCYTES NFR BLD: 27.7 % (ref 18–48)
MCH RBC QN AUTO: 28.5 PG (ref 27–31)
MCH RBC QN AUTO: 28.5 PG (ref 27–31)
MCHC RBC AUTO-ENTMCNC: 33.6 G/DL (ref 32–36)
MCHC RBC AUTO-ENTMCNC: 33.6 G/DL (ref 32–36)
MCV RBC AUTO: 85 FL (ref 82–98)
MCV RBC AUTO: 85 FL (ref 82–98)
METHADONE UR QL SCN>300 NG/ML: NEGATIVE
MICROSCOPIC COMMENT: NORMAL
MONOCYTES # BLD AUTO: 0.6 K/UL (ref 0.3–1)
MONOCYTES # BLD AUTO: 0.6 K/UL (ref 0.3–1)
MONOCYTES NFR BLD: 9.6 % (ref 4–15)
MONOCYTES NFR BLD: 9.6 % (ref 4–15)
NEUTROPHILS # BLD AUTO: 3.7 K/UL (ref 1.8–7.7)
NEUTROPHILS # BLD AUTO: 3.7 K/UL (ref 1.8–7.7)
NEUTROPHILS NFR BLD: 59.6 % (ref 38–73)
NEUTROPHILS NFR BLD: 59.6 % (ref 38–73)
NITRITE UR QL STRIP: NEGATIVE
NRBC BLD-RTO: 0 /100 WBC
NRBC BLD-RTO: 0 /100 WBC
OPIATES UR QL SCN: NEGATIVE
PCP UR QL SCN>25 NG/ML: NEGATIVE
PH UR STRIP: 6 [PH] (ref 5–8)
PLATELET # BLD AUTO: 230 K/UL (ref 150–450)
PLATELET # BLD AUTO: 230 K/UL (ref 150–450)
PMV BLD AUTO: 10.1 FL (ref 9.2–12.9)
PMV BLD AUTO: 10.1 FL (ref 9.2–12.9)
POTASSIUM SERPL-SCNC: 3.6 MMOL/L (ref 3.5–5.1)
PROT SERPL-MCNC: 6.4 G/DL (ref 6–8.4)
PROT UR QL STRIP: NEGATIVE
RBC # BLD AUTO: 4.53 M/UL (ref 4–5.4)
RBC # BLD AUTO: 4.53 M/UL (ref 4–5.4)
RBC #/AREA URNS AUTO: 3 /HPF (ref 0–4)
SALICYLATES SERPL-MCNC: <5 MG/DL (ref 15–30)
SODIUM SERPL-SCNC: 140 MMOL/L (ref 136–145)
SP GR UR STRIP: 1.01 (ref 1–1.03)
T4 FREE SERPL-MCNC: 1.56 NG/DL (ref 0.71–1.51)
TOXICOLOGY INFORMATION: ABNORMAL
TSH SERPL DL<=0.005 MIU/L-ACNC: <0.01 UIU/ML (ref 0.4–4)
URN SPEC COLLECT METH UR: ABNORMAL
WBC # BLD AUTO: 6.17 K/UL (ref 3.9–12.7)
WBC # BLD AUTO: 6.17 K/UL (ref 3.9–12.7)
WBC #/AREA URNS AUTO: 2 /HPF (ref 0–5)

## 2024-05-17 PROCEDURE — 81001 URINALYSIS AUTO W/SCOPE: CPT | Mod: XB | Performed by: EMERGENCY MEDICINE

## 2024-05-17 PROCEDURE — 94761 N-INVAS EAR/PLS OXIMETRY MLT: CPT

## 2024-05-17 PROCEDURE — 84443 ASSAY THYROID STIM HORMONE: CPT | Performed by: EMERGENCY MEDICINE

## 2024-05-17 PROCEDURE — 93005 ELECTROCARDIOGRAM TRACING: CPT

## 2024-05-17 PROCEDURE — 80178 ASSAY OF LITHIUM: CPT | Performed by: EMERGENCY MEDICINE

## 2024-05-17 PROCEDURE — 80179 DRUG ASSAY SALICYLATE: CPT | Performed by: EMERGENCY MEDICINE

## 2024-05-17 PROCEDURE — 84439 ASSAY OF FREE THYROXINE: CPT | Performed by: EMERGENCY MEDICINE

## 2024-05-17 PROCEDURE — 82077 ASSAY SPEC XCP UR&BREATH IA: CPT | Performed by: EMERGENCY MEDICINE

## 2024-05-17 PROCEDURE — 80143 DRUG ASSAY ACETAMINOPHEN: CPT | Performed by: EMERGENCY MEDICINE

## 2024-05-17 PROCEDURE — 85025 COMPLETE CBC W/AUTO DIFF WBC: CPT | Performed by: EMERGENCY MEDICINE

## 2024-05-17 PROCEDURE — 80053 COMPREHEN METABOLIC PANEL: CPT | Performed by: EMERGENCY MEDICINE

## 2024-05-17 PROCEDURE — 99285 EMERGENCY DEPT VISIT HI MDM: CPT | Mod: 25

## 2024-05-17 PROCEDURE — 80307 DRUG TEST PRSMV CHEM ANLYZR: CPT | Performed by: EMERGENCY MEDICINE

## 2024-05-17 PROCEDURE — 93010 ELECTROCARDIOGRAM REPORT: CPT | Mod: ,,, | Performed by: INTERNAL MEDICINE

## 2024-05-17 NOTE — ED TRIAGE NOTES
Roselia Mendez, a 68 y.o. female presents to the ED after taking a total of 4mg of PO xanax prior to having ingrown nail taken out on her left foot. Pt was unable to have procedure done due to her being too drowsy. Pt continuous to be drowsy at this time, but arouses to voice and is able to answer all questions appropriately.     Triage note:  Chief Complaint   Patient presents with    Drug Overdose     Pt. Was told by MD to take two of her Xanax for a procedure.  Pt. Now lethargic and  did not feel comfortable taking her home after procedure.  GCS of 15 when awake, but Pt. Continues to fall asleep after speaking.     Review of patient's allergies indicates:   Allergen Reactions    Promethazine      Psychosis     Past Medical History:   Diagnosis Date    Anxiety     Bipolar 2 disorder     Cancer     Depression     Thyroid disease

## 2024-05-17 NOTE — PROCEDURES
Presents to clinic for nail procedure. Took 2 mg Xanax this morning instead of usual dose of 1 mg Xanax; medication taken at 12:20 PM; is now A&O x2 and RASS-1; accompanied by daughter.     Vitals taken and stable except for labile heart rate, no major concerns.     Instructed daughter to wait with mother in lobby for another half hour or so till she is through the peak, then should be safe to return home. In case of further depression in mental state or breathing report to ED for fluids and reversal medications. Daughter stated understanding.     Will reschedule nail procedure.     Vinnie Cross DPM  Podiatric Medicine & Surgery  [facility]

## 2024-05-17 NOTE — ED PROVIDER NOTES
"Encounter Date: 5/17/2024       History     Chief Complaint   Patient presents with    Drug Overdose     Pt. Was told by MD to take two of her Xanax for a procedure.  Pt. Now lethargic and  did not feel comfortable taking her home after procedure.  GCS of 15 when awake, but Pt. Continues to fall asleep after speaking.     HPI  Ms. Mendez is a 57 y/o female with PMHx of bipolar II and hypothyroidism who presents to the ED due to excessive fatigue and "grogginess" secondary to Xanax intake prior to a podiatry procedure.  reports that she took Xanax in the car and ended up "passing out" in the podiatrist's office which elicited him to take her to the ED, could not go through with the procedure. Pt was initially minimally responsive but has gradually gained her communicative abilities though still very tired with eyes mostly closed and reports confusion with slowed thinking. States that her last intake of Xanax before today was a year ago (5mg). She denies any falls, head trauma, SOB, chest pain, sputum production, palpitations, or back pain radiating to the spine. She is compliant with her medications notably thyroid, lithium, and wellbutrin and has had no problems with them in the past. Denies alcohol and illicit drug use. Overall continues to be tired, but after neurological exam appears to gain more energy and functionality.     Review of patient's allergies indicates:   Allergen Reactions    Promethazine      Psychosis     Past Medical History:   Diagnosis Date    Anxiety     Bipolar 2 disorder     Cancer     Depression     Thyroid disease      Past Surgical History:   Procedure Laterality Date    thyriod      VASCULAR SURGERY       Family History   Problem Relation Name Age of Onset    Cancer Mother  80    Dementia Father  80     Social History     Tobacco Use    Smoking status: Never    Smokeless tobacco: Never   Substance Use Topics    Alcohol use: Never    Drug use: No     Review of Systems "   Constitutional:  Positive for activity change and fatigue. Negative for appetite change, chills and unexpected weight change.   Eyes:  Negative for visual disturbance.   Respiratory:  Negative for apnea, chest tightness and shortness of breath.    Cardiovascular:  Negative for chest pain and palpitations.   Neurological:  Positive for weakness and light-headedness. Negative for tremors.   Psychiatric/Behavioral:  Negative for suicidal ideas.        Physical Exam     Initial Vitals [05/17/24 1445]   BP Pulse Resp Temp SpO2   (!) 138/94 110 14 98.4 °F (36.9 °C) 98 %      MAP       --         Vitals:    05/17/24 1801 05/17/24 1900 05/17/24 1912 05/17/24 1930   BP: 117/73 120/61 125/66 136/69   BP Location:   Left arm    Patient Position:   Lying    Pulse: 108 106 92 104   Resp:  20 17 16   Temp:    98 °F (36.7 °C)   TempSrc:    Oral   SpO2: 96% 95% 100% 97%      Physical Exam    Constitutional: She appears well-developed and well-nourished. She is not diaphoretic. She is cooperative.  Non-toxic appearance. She does not have a sickly appearance. She does not appear ill. No distress.     Neurological: She is oriented to person, place, and time. She displays no atrophy and no tremor. No cranial nerve deficit or sensory deficit. She exhibits normal muscle tone. She displays no seizure activity. Coordination normal. GCS eye subscore is 4. GCS verbal subscore is 5. GCS motor subscore is 6.   Reflex Scores:       Brachioradialis reflexes are 2+ on the right side and 2+ on the left side.       Patellar reflexes are 2+ on the right side and 2+ on the left side.  No apparent neurological deficits       Neuro:   Oriented to PPT  CN intact, pupils equal and reactive to light  Motor strength UE LE 5/5  Reflexes 2+    Capillary refills normal    ED Course   Procedures  Labs Reviewed   TSH - Abnormal; Notable for the following components:       Result Value    TSH <0.010 (*)     All other components within normal limits    URINALYSIS, REFLEX TO URINE CULTURE - Abnormal; Notable for the following components:    Leukocytes, UA 1+ (*)     All other components within normal limits    Narrative:     Specimen Source->Urine   DRUG SCREEN PANEL, URINE EMERGENCY - Abnormal; Notable for the following components:    Benzodiazepines Presumptive Positive (*)     THC Presumptive Positive (*)     All other components within normal limits    Narrative:     Specimen Source->Urine   ACETAMINOPHEN LEVEL - Abnormal; Notable for the following components:    Acetaminophen (Tylenol), Serum <3.0 (*)     All other components within normal limits   SALICYLATE LEVEL - Abnormal; Notable for the following components:    Salicylate Lvl <5.0 (*)     All other components within normal limits   LITHIUM LEVEL - Abnormal; Notable for the following components:    Lithium Level 0.3 (*)     All other components within normal limits   LITHIUM LEVEL - Abnormal; Notable for the following components:    Lithium Level 0.3 (*)     All other components within normal limits   T4, FREE - Abnormal; Notable for the following components:    Free T4 1.56 (*)     All other components within normal limits   CBC W/ AUTO DIFFERENTIAL   CBC W/ AUTO DIFFERENTIAL   COMPREHENSIVE METABOLIC PANEL   ALCOHOL,MEDICAL (ETHANOL)   URINALYSIS MICROSCOPIC    Narrative:     Specimen Source->Urine   POCT GLUCOSE        ECG Results              EKG 12-lead (Final result)        Collection Time Result Time QRS Duration OHS QTC Calculation    05/17/24 16:42:18 05/18/24 10:36:59 100 482                     Final result by Interface, Lab In [facility] (05/18/24 10:37:07)                   Narrative:    Test Reason : T50.901A,    Vent. Rate : 103 BPM     Atrial Rate : 103 BPM     P-R Int : 158 ms          QRS Dur : 100 ms      QT Int : 368 ms       P-R-T Axes : 074 051 069 degrees     QTc Int : 482 ms    Sinus tachycardia  Otherwise normal ECG  When compared with ECG of 05-MAR-2024 10:47,  No significant  change was found  Confirmed by Harry Wiggins MD ([id number]) on 5/18/2024 10:36:55 AM    Referred By: [email or web address]   SELF           Confirmed By:Harry Wiggins MD                                  Imaging Results              X-Ray Foot Complete Left (Final result)  Result time 05/17/24 20:02:29      Final result by Alan Easley MD (05/17/24 20:02:29)                   Impression:      No evidence of a fracture or dislocation of the left foot.    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      Electronically signed by: Alan Easley MD  Date:    05/17/2024  Time:    20:02               Narrative:    EXAMINATION:  XR FOOT COMPLETE 3 VIEW LEFT    CLINICAL HISTORY:  Pain in left foot    TECHNIQUE:  AP, lateral and oblique views of the left foot were performed.    COMPARISON:  None    FINDINGS:  The bone mineralization is within normal limits.  There is no cortical step-off.  There is no evidence of periostitis.    There is joint space narrowing at the 1st MTP joint with flattening of the head of the 1st metatarsal.  There is also advanced osteophytosis at the 1st MTP joint.    There is soft tissue swelling of the left foot.  No radiopaque foreign body is identified.    There is no evidence of a fracture or dislocation.                                       Medications - No data to display  Medical Decision Making  Amount and/or Complexity of Data Reviewed  Labs: ordered.  Radiology: ordered.                                      Clinical Impression:  Final diagnoses:  [M79.672] Foot pain, left  [T50.901A] Accidental drug overdose, initial encounter (Primary)          ED Disposition Condition    Discharge Stable          ED Prescriptions    None       Follow-up Information       Follow up With Specialties Details Why Contact Info    [facility] - Emergency Dept Emergency Medicine  If symptoms worsen [address]  [facility] [id number] [phone]             Hilario Oseguera MD  05/24/24 [id number]

## 2024-05-18 LAB
OHS QRS DURATION: 100 MS
OHS QTC CALCULATION: 482 MS
POCT GLUCOSE: 91 MG/DL (ref 70–110)

## 2024-06-10 ENCOUNTER — PATIENT MESSAGE (OUTPATIENT)
Dept: INTERNAL MEDICINE | Facility: CLINIC | Age: 69
End: 2024-06-10
Payer: MEDICARE

## 2024-06-13 ENCOUNTER — PROCEDURE VISIT (OUTPATIENT)
Dept: PODIATRY | Facility: CLINIC | Age: 69
End: 2024-06-13
Payer: MEDICARE

## 2024-06-13 VITALS
OXYGEN SATURATION: 99 % | BODY MASS INDEX: 19.91 KG/M2 | DIASTOLIC BLOOD PRESSURE: 94 MMHG | HEART RATE: 75 BPM | WEIGHT: 119.5 LBS | HEIGHT: 65 IN | SYSTOLIC BLOOD PRESSURE: 146 MMHG

## 2024-06-13 DIAGNOSIS — M79.675 PAIN IN TOE OF LEFT FOOT: ICD-10-CM

## 2024-06-13 DIAGNOSIS — L60.0 INGROWN NAIL: Primary | ICD-10-CM

## 2024-06-13 PROCEDURE — 11750 EXCISION NAIL&NAIL MATRIX: CPT | Mod: T4,PBBFAC | Performed by: STUDENT IN AN ORGANIZED HEALTH CARE EDUCATION/TRAINING PROGRAM

## 2024-06-13 NOTE — PROCEDURES
Presents for planned procedure, see prior progress note.     Nail Removal    Date/Time: 6/13/2024 1:15 PM    Performed by: Vinnie Cross DPM  Authorized by: Vinnie Cross DPM    Consent Done?:  Yes (Written)  Time out: Immediately prior to the procedure a time out was called    Location:     Location:  Left foot    Location detail:  Left little toe  Anesthesia:     Anesthesia:  Digital block    Local anesthetic:  Lidocaine 1% without epinephrine    Anesthetic total (ml):  5  Procedure Details:     Preparation:  Skin prepped with alcohol and skin prepped with Betadine    Amount removed:  Partial    Side:  Medial    Wedge excision of skin of nail fold: No      Nail bed sutured?: No      Nail matrix removed:  Partial    Removal method:  Phenol and alcohol    Dressing applied:  Antibiotic ointment, dressing applied and Xeroform gauze    Patient tolerance:  Patient tolerated the procedure well with no immediate complications    RTC in 2 weeks

## 2024-08-14 ENCOUNTER — HOSPITAL ENCOUNTER (EMERGENCY)
Facility: HOSPITAL | Age: 69
Discharge: HOME | End: 2024-08-14
Attending: STUDENT IN AN ORGANIZED HEALTH CARE EDUCATION/TRAINING PROGRAM
Payer: MEDICARE

## 2024-08-14 VITALS
HEIGHT: 65 IN | BODY MASS INDEX: 19.99 KG/M2 | HEART RATE: 100 BPM | RESPIRATION RATE: 16 BRPM | TEMPERATURE: 98 F | WEIGHT: 120 LBS | OXYGEN SATURATION: 98 % | SYSTOLIC BLOOD PRESSURE: 138 MMHG | DIASTOLIC BLOOD PRESSURE: 79 MMHG

## 2024-08-14 DIAGNOSIS — R42 LIGHTHEADEDNESS: ICD-10-CM

## 2024-08-14 DIAGNOSIS — F41.9 ANXIETY: ICD-10-CM

## 2024-08-14 DIAGNOSIS — R06.02 SHORTNESS OF BREATH: ICD-10-CM

## 2024-08-14 DIAGNOSIS — R68.89 MULTIPLE COMPLAINTS: ICD-10-CM

## 2024-08-14 DIAGNOSIS — R00.0 TACHYCARDIA: ICD-10-CM

## 2024-08-14 DIAGNOSIS — F31.9 BIPOLAR AFFECTIVE DISORDER, REMISSION STATUS UNSPECIFIED: Primary | ICD-10-CM

## 2024-08-14 LAB
ALBUMIN SERPL BCP-MCNC: 3.8 G/DL (ref 3.5–5.2)
ALP SERPL-CCNC: 96 U/L (ref 55–135)
ALT SERPL W/O P-5'-P-CCNC: 22 U/L (ref 10–44)
AMPHET+METHAMPHET UR QL: NEGATIVE
ANION GAP SERPL CALC-SCNC: 8 MMOL/L (ref 8–16)
AST SERPL-CCNC: 22 U/L (ref 10–40)
BARBITURATES UR QL SCN>200 NG/ML: NEGATIVE
BASOPHILS # BLD AUTO: 0.03 K/UL (ref 0–0.2)
BASOPHILS NFR BLD: 0.6 % (ref 0–1.9)
BENZODIAZ UR QL SCN>200 NG/ML: NEGATIVE
BILIRUB SERPL-MCNC: 0.5 MG/DL (ref 0.1–1)
BILIRUB UR QL STRIP: NEGATIVE
BNP SERPL-MCNC: 44 PG/ML (ref 0–99)
BUN SERPL-MCNC: 8 MG/DL (ref 8–23)
BZE UR QL SCN: NEGATIVE
CALCIUM SERPL-MCNC: 9.9 MG/DL (ref 8.7–10.5)
CANNABINOIDS UR QL SCN: NEGATIVE
CHLORIDE SERPL-SCNC: 110 MMOL/L (ref 95–110)
CLARITY UR REFRACT.AUTO: CLEAR
CO2 SERPL-SCNC: 23 MMOL/L (ref 23–29)
COLOR UR AUTO: COLORLESS
CREAT SERPL-MCNC: 0.7 MG/DL (ref 0.5–1.4)
CREAT UR-MCNC: 5 MG/DL (ref 15–325)
DIFFERENTIAL METHOD BLD: NORMAL
EOSINOPHIL # BLD AUTO: 0.1 K/UL (ref 0–0.5)
EOSINOPHIL NFR BLD: 1.2 % (ref 0–8)
ERYTHROCYTE [DISTWIDTH] IN BLOOD BY AUTOMATED COUNT: 13 % (ref 11.5–14.5)
EST. GFR  (NO RACE VARIABLE): >60 ML/MIN/1.73 M^2
GLUCOSE SERPL-MCNC: 102 MG/DL (ref 70–110)
GLUCOSE UR QL STRIP: NEGATIVE
HCT VFR BLD AUTO: 41.1 % (ref 37–48.5)
HCV AB SERPL QL IA: NORMAL
HGB BLD-MCNC: 13.7 G/DL (ref 12–16)
HGB UR QL STRIP: NEGATIVE
HIV 1+2 AB+HIV1 P24 AG SERPL QL IA: NORMAL
IMM GRANULOCYTES # BLD AUTO: 0.01 K/UL (ref 0–0.04)
IMM GRANULOCYTES NFR BLD AUTO: 0.2 % (ref 0–0.5)
KETONES UR QL STRIP: NEGATIVE
LEUKOCYTE ESTERASE UR QL STRIP: NEGATIVE
LITHIUM SERPL-SCNC: 0.4 MMOL/L (ref 0.6–1.2)
LYMPHOCYTES # BLD AUTO: 1.5 K/UL (ref 1–4.8)
LYMPHOCYTES NFR BLD: 29.8 % (ref 18–48)
MAGNESIUM SERPL-MCNC: 2 MG/DL (ref 1.6–2.6)
MCH RBC QN AUTO: 28.1 PG (ref 27–31)
MCHC RBC AUTO-ENTMCNC: 33.3 G/DL (ref 32–36)
MCV RBC AUTO: 84 FL (ref 82–98)
METHADONE UR QL SCN>300 NG/ML: NEGATIVE
MONOCYTES # BLD AUTO: 0.4 K/UL (ref 0.3–1)
MONOCYTES NFR BLD: 8.5 % (ref 4–15)
NEUTROPHILS # BLD AUTO: 3 K/UL (ref 1.8–7.7)
NEUTROPHILS NFR BLD: 59.7 % (ref 38–73)
NITRITE UR QL STRIP: NEGATIVE
NRBC BLD-RTO: 0 /100 WBC
OHS QRS DURATION: 92 MS
OHS QTC CALCULATION: 484 MS
OPIATES UR QL SCN: NEGATIVE
PCP UR QL SCN>25 NG/ML: NEGATIVE
PH UR STRIP: 7 [PH] (ref 5–8)
PLATELET # BLD AUTO: 233 K/UL (ref 150–450)
PMV BLD AUTO: 10.3 FL (ref 9.2–12.9)
POCT GLUCOSE: 103 MG/DL (ref 70–110)
POTASSIUM SERPL-SCNC: 4 MMOL/L (ref 3.5–5.1)
PROT SERPL-MCNC: 6.7 G/DL (ref 6–8.4)
PROT UR QL STRIP: NEGATIVE
RBC # BLD AUTO: 4.87 M/UL (ref 4–5.4)
SARS-COV-2 RDRP RESP QL NAA+PROBE: NEGATIVE
SODIUM SERPL-SCNC: 141 MMOL/L (ref 136–145)
SP GR UR STRIP: 1 (ref 1–1.03)
T4 FREE SERPL-MCNC: 1.35 NG/DL (ref 0.71–1.51)
TOXICOLOGY INFORMATION: ABNORMAL
TROPONIN I SERPL DL<=0.01 NG/ML-MCNC: <0.006 NG/ML (ref 0–0.03)
TSH SERPL DL<=0.005 MIU/L-ACNC: <0.01 UIU/ML (ref 0.4–4)
URN SPEC COLLECT METH UR: ABNORMAL
WBC # BLD AUTO: 4.96 K/UL (ref 3.9–12.7)

## 2024-08-14 PROCEDURE — 80307 DRUG TEST PRSMV CHEM ANLYZR: CPT

## 2024-08-14 PROCEDURE — 63600175 PHARM REV CODE 636 W HCPCS: Performed by: STUDENT IN AN ORGANIZED HEALTH CARE EDUCATION/TRAINING PROGRAM

## 2024-08-14 PROCEDURE — 82962 GLUCOSE BLOOD TEST: CPT

## 2024-08-14 PROCEDURE — 93005 ELECTROCARDIOGRAM TRACING: CPT

## 2024-08-14 PROCEDURE — 99285 EMERGENCY DEPT VISIT HI MDM: CPT | Mod: 25

## 2024-08-14 PROCEDURE — 84443 ASSAY THYROID STIM HORMONE: CPT | Performed by: STUDENT IN AN ORGANIZED HEALTH CARE EDUCATION/TRAINING PROGRAM

## 2024-08-14 PROCEDURE — 84484 ASSAY OF TROPONIN QUANT: CPT | Performed by: STUDENT IN AN ORGANIZED HEALTH CARE EDUCATION/TRAINING PROGRAM

## 2024-08-14 PROCEDURE — 84439 ASSAY OF FREE THYROXINE: CPT | Performed by: STUDENT IN AN ORGANIZED HEALTH CARE EDUCATION/TRAINING PROGRAM

## 2024-08-14 PROCEDURE — 87389 HIV-1 AG W/HIV-1&-2 AB AG IA: CPT | Performed by: PHYSICIAN ASSISTANT

## 2024-08-14 PROCEDURE — 80053 COMPREHEN METABOLIC PANEL: CPT | Performed by: STUDENT IN AN ORGANIZED HEALTH CARE EDUCATION/TRAINING PROGRAM

## 2024-08-14 PROCEDURE — 83880 ASSAY OF NATRIURETIC PEPTIDE: CPT | Performed by: STUDENT IN AN ORGANIZED HEALTH CARE EDUCATION/TRAINING PROGRAM

## 2024-08-14 PROCEDURE — 80178 ASSAY OF LITHIUM: CPT | Performed by: STUDENT IN AN ORGANIZED HEALTH CARE EDUCATION/TRAINING PROGRAM

## 2024-08-14 PROCEDURE — 83735 ASSAY OF MAGNESIUM: CPT | Performed by: STUDENT IN AN ORGANIZED HEALTH CARE EDUCATION/TRAINING PROGRAM

## 2024-08-14 PROCEDURE — 93010 ELECTROCARDIOGRAM REPORT: CPT | Mod: ,,, | Performed by: INTERNAL MEDICINE

## 2024-08-14 PROCEDURE — 96360 HYDRATION IV INFUSION INIT: CPT

## 2024-08-14 PROCEDURE — 81003 URINALYSIS AUTO W/O SCOPE: CPT | Performed by: STUDENT IN AN ORGANIZED HEALTH CARE EDUCATION/TRAINING PROGRAM

## 2024-08-14 PROCEDURE — U0002 COVID-19 LAB TEST NON-CDC: HCPCS | Performed by: STUDENT IN AN ORGANIZED HEALTH CARE EDUCATION/TRAINING PROGRAM

## 2024-08-14 PROCEDURE — 85025 COMPLETE CBC W/AUTO DIFF WBC: CPT | Performed by: STUDENT IN AN ORGANIZED HEALTH CARE EDUCATION/TRAINING PROGRAM

## 2024-08-14 PROCEDURE — 86803 HEPATITIS C AB TEST: CPT | Performed by: PHYSICIAN ASSISTANT

## 2024-08-14 RX ADMIN — SODIUM CHLORIDE, POTASSIUM CHLORIDE, SODIUM LACTATE AND CALCIUM CHLORIDE 1000 ML: 600; 310; 30; 20 INJECTION, SOLUTION INTRAVENOUS at 09:08

## 2024-08-14 NOTE — ED NOTES
Pt identifiers Roselia Mendez were checked and are correct  LOC: The patient is awake, alert, aware of environment with an appropriate affect. Oriented x4, speaking appropriately  APPEARANCE: Pt complains of dizziness , in no acute distress, pt is clean and well groomed, clothing properly fastened  SKIN: Skin warm, dry and intact, normal skin turgor, moist mucus membranes  RESPIRATORY: Airway is open and patent, respirations are spontaneous, even and unlabored, normal effort and rate  CARDIAC: Normal rate and rhythm, no peripheral edema noted, capillary refill < 3 seconds, bilateral radial pulses 2+  ABDOMEN: Soft, nontender, nondistended. Bowel sounds present   NEUROLOGIC: PERRL, facial expression is symmetrical, patient moving all extremities spontaneously, normal sensation in all extremities when touched with a finger.  Follows all commands appropriately  MUSCULOSKELETAL: No obvious deformities.

## 2024-08-14 NOTE — DISCHARGE INSTRUCTIONS
It was a pleasure taking care of you today!       Home Care:   - Continue home medications as prescribed    Follow-Up Plan:  - Follow-up with primary care doctor within 3 - 5 days to discuss your recent ER visit and any additional concerns that you may have.  - You should follow up with whoever prescribes your Lithium to discuss potential needs to increase the dose  - Additional testing and/or evaluation as directed by your primary doctor    Return to the Emergency Department for symptoms including but not limited to: persistence or worsening of symptoms, shortness of breath or chest pain, inability to drink without vomiting, passing out/fainting/ loss of consciousness, or if you have other concerns.

## 2024-08-14 NOTE — ED PROVIDER NOTES
"Encounter Date: 8/14/2024       History     Chief Complaint   Patient presents with    Multiple complaints     Over the yrs over use of antibiotics per pt, foggy, near syncopal      70 y/o F with hx of postablative hypothyroidism and mixed bipolar disorder presents to the ED for evaluation of light-headedness and brain fog that began when she woke up this morning. Pt reports that due to "years of abx overuse" she has become susceptible to bacterial infections such as UTI's. She woke up this morning feels a "fogginess," light-headed, and disoriented all of which she reports are symptoms consistent with past bouts of UTI's. Pt also c/o chills, SOB, and headaches. Pt denies any vomiting, fever, muscle aches, dysuria, hematuria, back pain, or any other sxs at this time. She notes that she jumped into a pool meant for children yesterday and is concerned that she has an infection. Pt reports that she has been compliant with her lithium, wellbutrin, and thyroid medication.         Review of patient's allergies indicates:   Allergen Reactions    Promethazine      Psychosis     Past Medical History:   Diagnosis Date    Anxiety     Bipolar 2 disorder     Cancer     Depression     Thyroid disease      Past Surgical History:   Procedure Laterality Date    thyriod      VASCULAR SURGERY       Family History   Problem Relation Name Age of Onset    Cancer Mother  80    Dementia Father  80     Social History     Tobacco Use    Smoking status: Never    Smokeless tobacco: Never   Substance Use Topics    Alcohol use: Never    Drug use: No     Review of Systems    Physical Exam     Initial Vitals [08/14/24 0830]   BP Pulse Resp Temp SpO2   (!) 171/86 (!) 111 18 97.6 °F (36.4 °C) 98 %      MAP       --         Physical Exam    Constitutional: She appears well-developed and well-nourished. She is not diaphoretic. No distress.   HENT:   Head: Normocephalic and atraumatic.   Eyes: EOM are normal. Right eye exhibits no discharge. Left eye " exhibits no discharge.   Neck: No JVD present.   Normal range of motion.  Cardiovascular:  Regular rhythm, S1 normal and S2 normal.   Tachycardia present.   Exam reveals no gallop and no friction rub.       No murmur heard.  Pulmonary/Chest: Breath sounds normal. No respiratory distress. She has no wheezes. She has no rales.   Abdominal: Abdomen is soft. Bowel sounds are normal. She exhibits no distension. There is no abdominal tenderness.   No right CVA tenderness.  No left CVA tenderness. There is no rebound.   Musculoskeletal:         General: No edema. Normal range of motion.      Cervical back: Normal range of motion.     Neurological: She is alert and oriented to person, place, and time.   Skin: Skin is warm and dry.         ED Course   Procedures  Labs Reviewed   URINALYSIS, REFLEX TO URINE CULTURE - Abnormal       Result Value    Specimen UA Urine, Clean Catch      Color, UA Colorless (*)     Appearance, UA Clear      pH, UA 7.0      Specific Gravity, UA 1.005      Protein, UA Negative      Glucose, UA Negative      Ketones, UA Negative      Bilirubin (UA) Negative      Occult Blood UA Negative      Nitrite, UA Negative      Leukocytes, UA Negative      Narrative:     Specimen Source->Urine   TSH - Abnormal    TSH <0.010 (*)    LITHIUM LEVEL - Abnormal    Lithium Level 0.4 (*)    DRUG SCREEN PANEL, URINE EMERGENCY - Abnormal    Benzodiazepines Negative      Methadone metabolites Negative      Cocaine (Metab.) Negative      Opiate Scrn, Ur Negative      Barbiturate Screen, Ur Negative      Amphetamine Screen, Ur Negative      THC Negative      Phencyclidine Negative      Creatinine, Urine 5.0 (*)     Toxicology Information SEE COMMENT      Narrative:     Specimen Source->Urine   HIV 1 / 2 ANTIBODY    HIV 1/2 Ag/Ab Non-reactive      Narrative:     Release to patient->Immediate   HEPATITIS C ANTIBODY    Hepatitis C Ab Non-reactive      Narrative:     Release to patient->Immediate   CBC W/ AUTO DIFFERENTIAL     WBC 4.96      RBC 4.87      Hemoglobin 13.7      Hematocrit 41.1      MCV 84      MCH 28.1      MCHC 33.3      RDW 13.0      Platelets 233      MPV 10.3      Immature Granulocytes 0.2      Gran # (ANC) 3.0      Immature Grans (Abs) 0.01      Lymph # 1.5      Mono # 0.4      Eos # 0.1      Baso # 0.03      nRBC 0      Gran % 59.7      Lymph % 29.8      Mono % 8.5      Eosinophil % 1.2      Basophil % 0.6      Differential Method Automated     COMPREHENSIVE METABOLIC PANEL    Sodium 141      Potassium 4.0      Chloride 110      CO2 23      Glucose 102      BUN 8      Creatinine 0.7      Calcium 9.9      Total Protein 6.7      Albumin 3.8      Total Bilirubin 0.5      Alkaline Phosphatase 96      AST 22      ALT 22      eGFR >60.0      Anion Gap 8     MAGNESIUM    Magnesium 2.0     SARS-COV-2 RNA AMPLIFICATION, QUAL    SARS-CoV-2 RNA, Amplification, Qual Negative     TROPONIN I    Troponin I <0.006     B-TYPE NATRIURETIC PEPTIDE    BNP 44     T4, FREE    Free T4 1.35     POCT GLUCOSE    POCT Glucose 103     POCT GLUCOSE MONITORING CONTINUOUS        ECG Results              EKG 12-lead (Final result)        Collection Time Result Time QRS Duration OHS QTC Calculation    08/14/24 08:35:05 08/14/24 09:09:43 92 484                     Final result by Interface, Lab In [facility] (08/14/24 09:09:53)                   Narrative:    Test Reason : R68.89,    Vent. Rate : 109 BPM     Atrial Rate : 109 BPM     P-R Int : 142 ms          QRS Dur : 092 ms      QT Int : 360 ms       P-R-T Axes : 071 043 063 degrees     QTc Int : 484 ms    Sinus tachycardia  Otherwise normal ECG  When compared with ECG of 17-MAY-2024 16:42,  No significant change was found  Confirmed by Delgado Youssef MD ([id number]) on 8/14/2024 9:09:40 AM    Referred By:             Confirmed By:Delgado Youssef MD                                  Imaging Results              X-Ray Chest AP Portable (Final result)  Result time 08/14/24 11:18:03      Final result by Renny Andrews  MD LIZETTE (08/14/24 11:18:03)                   Impression:      1. Interstitial findings are accentuated by habitus, no large focal consolidation.      Electronically signed by: Renny Andrews MD  Date:    08/14/2024  Time:    11:18               Narrative:    EXAMINATION:  XR CHEST AP PORTABLE    CLINICAL HISTORY:  sob;    TECHNIQUE:  Single frontal view of the chest was performed.    COMPARISON:  None    FINDINGS:  The cardiomediastinal silhouette is not enlarged.  There is no pleural effusion.  The trachea is midline.  The lungs are symmetrically expanded bilaterally with mildly coarse interstitial attenuation.  No large focal consolidation seen.  There is no pneumothorax.  The osseous structures are remarkable for degenerative change..                                       Medications   lactated ringers bolus 1,000 mL (0 mLs Intravenous Stopped 8/14/24 1049)     Medical Decision Making  Differential diagnosis for this encounter include but are not limited to UTI, hypothyroidism, lithium level abnormality, ACS, arrhythmia. Will order EKG, troponin, lithium level, TSH, Mg, BNP, UA and follow up.     EKG w/ sinus tachycardia but no concerning ST abnormalities. UA without infection. CBC resulted within normal limits. Lithium level resulted 0.4. CMP, BNP, troponin, Mg, POCT glucose all without significant findings. On thyroid supplementation and TSH <0.010.     Amount and/or Complexity of Data Reviewed  External Data Reviewed: radiology.     Details: Most recent stress echo:    Stress Protocol: The patient exercised for 5 minutes 52 seconds on a high ramp protocol, corresponding to a functional capacity of 9 METS, achieving a peak heart rate of 144 bpm, which is 97 % of the age predicted maximum heart rate. Their exercise capacity was average. The patient reported no symptoms during the stress test. The test was stopped because the patient experienced fatigue.  ·  ECG Conclusion: The ECG portion of the study is  "negative for ischemia.  ·  Left Ventricle: The left ventricle is normal in size. Normal wall thickness. There is normal systolic function. Ejection fraction by visual approximation is 60%. There is normal diastolic function.  ·  Right Ventricle: Normal right ventricular cavity size. Wall thickness is normal. Right ventricle wall motion  is normal. Systolic function is normal.  ·  Pulmonary Artery: The estimated pulmonary artery systolic pressure is 30 mmHg.  ·  IVC/SVC: Normal venous pressure at 3 mmHg.  ·  Baseline ECG: The Baseline ECG reveals sinus rhythm. The axis is normal. The ST segments are normal.  ·  Stress ECG: There are no ST segment deviation identified during the protocol. During stress, occasional PVCs are noted. There is normal blood pressure response with stress.  ·  Post-stress Impression: The study is consistent with ischemia in the distribution of the mid LAD.    Labs: ordered. Decision-making details documented in ED Course.  Radiology: ordered and independent interpretation performed. Decision-making details documented in ED Course.  ECG/medicine tests: ordered and independent interpretation performed. Decision-making details documented in ED Course.    Risk  Diagnosis or treatment significantly limited by social determinants of health.              Attending Attestation:   Physician Attestation Statement for Resident:  As the supervising MD   Physician Attestation Statement: I have personally seen and examined this patient.   I agree with the above history.  -: Patient states that she jumped into a "bacteria pull" yesterday and since then has felt the bacteria going up her body causing UTI.  She states it usually she just talks to her friend who is a doctor who has always prescribed her antibiotics but today she decided she did not want to do that and wanted to come to the ED for evaluation.   As the supervising MD I agree with the above PE.     As the supervising MD I agree with the above " treatment, course, plan, and disposition.   -:     See ED course for additional attending MDM                     ED Course as of 08/14/24 1211   Wed Aug 14, 2024   0850 EKG 12-lead  EKG independently interpreted by me shows sinus tachycardia, rate 109, no STEMI, unremarkable ST segment [BD]   1028 Urinalysis, Reflex to Urine Culture Urine, Clean Catch(!)  UA unremarkable without evidence of infection or hematuria   [BD]   1028 Pulse: 95  Improving heart rate [BD]   1028 CBC auto differential  CBC unremarkable without leukocytosis, significant anemia, or decreased platelets   [BD]   1028 POCT Glucose: 103 [BD]   1057 Troponin I: <0.006  ACS unlikely [BD]   1058 BNP: 44  Inconsistent with CHF [BD]   1058 Lithium Level(!): 0.4  Chronically low. Reports compliance [BD]   1058 Comprehensive metabolic panel  CMP unremarkable without significant electrolyte derangement, impaired renal function, or elevated LFTs   [BD]   1139 SARS-CoV-2 RNA, Amplification, Qual: Negative [BD]   1140 X-Ray Chest AP Portable  Chest x-ray independently interpreted by me shows no acute process such as pneumonia, pneumothorax, or pulmonary edema.    [BD]   1152 Free T4: 1.35 [BD]   1152 Patient's laboratory workup was unremarkable other than her low lithium level.  We have encouraged her to follow up with her PCP/psychiatrist for adjustments to her dose and close management.  She understands that I think this could be contributing to her symptoms.  She said she was not sure what is going on but as long as we were confident she does not have a bacterial infection that is spread throughout her body, she was comfortable going home.  Patient provided reassurance.  Patient was hemodynamically and clinically stable. Patient will be discharged at this time. Patient has been given home care instructions, follow up instructions, and strict return precautions. They agree with and are comfortable with the plan.  [BD]      ED Course User Index  [BD]  Francisco Shah MD                           Clinical Impression:  Final diagnoses:  [R68.89] Multiple complaints  [R00.0] Tachycardia  [R42] Lightheadedness  [R06.02] Shortness of breath  [F41.9] Anxiety  [F31.9] Bipolar affective disorder, remission status unspecified (Primary)          ED Disposition Condition    Discharge Stable          ED Prescriptions    None       Follow-up Information       Follow up With Specialties Details Why Contact Info    Nicho Wilkinson MD Internal Medicine Schedule an appointment as soon as possible for a visit  To discuss your recent ER visit and any additional concerns that you may have [address]  [place] [address]  [phone]      [facility] - Emergency Dept Emergency Medicine Go to  As needed, If symptoms worsen [address] [facility] [id number] [phone]             Francisco Shah MD  08/14/24 1210       Francisco Shah MD  08/14/24 1211

## 2024-08-14 NOTE — ED TRIAGE NOTES
"Pt states she got in to a pool yesterday at 06:30 PM to pull out her grandson  Pt states the pool was loaded with bacteria  This morning upon awakening " I could feel it go straight to my brain"  Complains of not being able to think right and had off and on chills  "

## 2025-02-06 ENCOUNTER — OFFICE VISIT (OUTPATIENT)
Dept: URGENT CARE | Facility: CLINIC | Age: 70
End: 2025-02-06
Payer: MEDICARE

## 2025-02-06 VITALS
TEMPERATURE: 98 F | OXYGEN SATURATION: 99 % | HEART RATE: 98 BPM | BODY MASS INDEX: 19.94 KG/M2 | WEIGHT: 119.69 LBS | DIASTOLIC BLOOD PRESSURE: 81 MMHG | RESPIRATION RATE: 17 BRPM | HEIGHT: 65 IN | SYSTOLIC BLOOD PRESSURE: 136 MMHG

## 2025-02-06 DIAGNOSIS — S20.212A CONTUSION OF RIB ON LEFT SIDE, INITIAL ENCOUNTER: ICD-10-CM

## 2025-02-06 DIAGNOSIS — R07.81 RIB PAIN ON LEFT SIDE: Primary | ICD-10-CM

## 2025-02-06 PROCEDURE — 71100 X-RAY EXAM RIBS UNI 2 VIEWS: CPT | Mod: LT,S$GLB,, | Performed by: RADIOLOGY

## 2025-02-06 PROCEDURE — 99203 OFFICE O/P NEW LOW 30 MIN: CPT | Mod: S$GLB,,, | Performed by: NURSE PRACTITIONER

## 2025-02-06 RX ORDER — CEPHALEXIN 500 MG/1
500 CAPSULE ORAL 3 TIMES DAILY
COMMUNITY
Start: 2024-12-13 | End: 2025-02-06 | Stop reason: ALTCHOICE

## 2025-02-06 RX ORDER — CHLORHEXIDINE GLUCONATE ORAL RINSE 1.2 MG/ML
15 SOLUTION DENTAL 2 TIMES DAILY
COMMUNITY
Start: 2024-10-24

## 2025-02-06 RX ORDER — LEVOFLOXACIN 750 MG/1
750 TABLET ORAL
COMMUNITY
Start: 2024-10-24

## 2025-02-06 RX ORDER — VALACYCLOVIR HYDROCHLORIDE 1 G/1
1000 TABLET, FILM COATED ORAL
COMMUNITY
Start: 2024-10-24

## 2025-02-06 NOTE — PROGRESS NOTES
"Subjective:      Patient ID: Roselia Mendez is a 69 y.o. female.    Vitals:  height is 5' 5" (1.651 m) and weight is 54.3 kg (119 lb 11.4 oz). Her oral temperature is 98 °F (36.7 °C). Her blood pressure is 136/81 and her pulse is 98. Her respiration is 17 and oxygen saturation is 99%.     Chief Complaint: Fall    Pt presents today w/ lt rib injury from a fall ; incident occurred approx 4x days ago. Pt reports she tripped while carrying a bag and fell on top of  boards ; pt c/o pain when changing position , tender to touch and  deep breathing. Pt has a minor abrasion below lt breast. Pt denies head trauma , loss of consciousness, emesis , vision change , loss of sensation and injury to any other extremity. Pt tried ibuprofen ;mild relief.     Provider note begins here:  Patient is a 69 year old female here with complaints of left right pain x4 days. She tripped and fell on some wooden planks. The area is tender to touch and hurts with deep breaths. She denies shortness of breath or difficulty breathing    Fall  The accident occurred 5 to 7 days ago. The fall occurred while walking. Distance fallen: wood. Impact surface: wood. The volume of blood lost was minimal. Point of impact: lt side. Pain location: lt rib cage. The pain is at a severity of 4/10. The pain is mild. The symptoms are aggravated by pressure on injury, movement and extension (breathing). Pertinent negatives include no abdominal pain, bowel incontinence, fever, headaches, hearing loss, hematuria, loss of consciousness, nausea, numbness, tingling, visual change or vomiting. Treatments tried: ibuprofen. The treatment provided mild relief.       Constitution: Negative for fever.   Gastrointestinal:  Negative for abdominal pain, nausea, vomiting and bowel incontinence.   Genitourinary:  Negative for hematuria.   Neurological:  Negative for headaches, loss of consciousness and numbness.      Objective:     Physical Exam   Constitutional: She is oriented to " person, place, and time. She appears well-developed. She is cooperative.  Non-toxic appearance. She does not appear ill. No distress.   HENT:   Head: Normocephalic and atraumatic.   Ears:   Right Ear: Hearing, tympanic membrane, external ear and ear canal normal.   Left Ear: Hearing, tympanic membrane, external ear and ear canal normal.   Nose: Nose normal. No mucosal edema, rhinorrhea or nasal deformity. No epistaxis. Right sinus exhibits no maxillary sinus tenderness and no frontal sinus tenderness. Left sinus exhibits no maxillary sinus tenderness and no frontal sinus tenderness.   Mouth/Throat: Uvula is midline, oropharynx is clear and moist and mucous membranes are normal. No trismus in the jaw. Normal dentition. No uvula swelling. No oropharyngeal exudate, posterior oropharyngeal edema or posterior oropharyngeal erythema.   Eyes: Conjunctivae and lids are normal. No scleral icterus.   Neck: Trachea normal and phonation normal. Neck supple. No edema present. No erythema present. No neck rigidity present.   Cardiovascular: Normal rate, regular rhythm, normal heart sounds and normal pulses.   Pulmonary/Chest: Effort normal and breath sounds normal. No stridor. No respiratory distress. She has no decreased breath sounds. She has no wheezes. She has no rhonchi. She has no rales. Chest wall is not dull to percussion. She exhibits tenderness. She exhibits no mass, no bony tenderness, no laceration, no crepitus, no edema, no deformity, no swelling and no retraction.       Abdominal: Normal appearance.   Musculoskeletal: Normal range of motion.         General: No deformity. Normal range of motion.   Neurological: She is alert and oriented to person, place, and time. She exhibits normal muscle tone. Coordination normal.   Skin: Skin is warm, dry, intact, not diaphoretic and not pale.   Psychiatric: Her speech is normal and behavior is normal. Judgment and thought content normal.   Nursing note and vitals  reviewed.      Assessment:     1. Rib pain on left side    2. Contusion of rib on left side, initial encounter        Plan:       Rib pain on left side  -     X-Ray Ribs 2 View Left; Future; Expected date: 02/06/2025    Contusion of rib on left side, initial encounter           X-Ray Ribs 2 View Left    Result Date: 2/6/2025  EXAMINATION: XR RIBS 2 VIEW LEFT CLINICAL HISTORY: Pleurodynia TECHNIQUE: Two views of the left ribs were performed. COMPARISON: None. FINDINGS: The left ribs appear intact.  There is no evidence for acute fracture or bone destruction.  There is no evidence for left-sided pneumothorax or left-sided pleural effusion.  Soft tissues are unremarkable.     Left ribs appear intact without evidence for acute fracture or bone destruction. Electronically signed by: Michael Weiss MD Date:    02/06/2025 Time:    16:28

## 2025-02-07 NOTE — PATIENT INSTRUCTIONS
Ice as needed. Continue to take tylenol or ibuprofen as needed.    Return to clinic or ED for worsening pain, shortness of breath.

## 2025-03-12 DIAGNOSIS — Z78.0 MENOPAUSE: ICD-10-CM

## 2025-03-19 ENCOUNTER — OFFICE VISIT (OUTPATIENT)
Dept: URGENT CARE | Facility: CLINIC | Age: 70
End: 2025-03-19
Payer: MEDICARE

## 2025-03-19 VITALS
SYSTOLIC BLOOD PRESSURE: 128 MMHG | OXYGEN SATURATION: 98 % | BODY MASS INDEX: 20.42 KG/M2 | HEIGHT: 65 IN | DIASTOLIC BLOOD PRESSURE: 83 MMHG | RESPIRATION RATE: 17 BRPM | TEMPERATURE: 98 F | HEART RATE: 103 BPM | WEIGHT: 122.56 LBS

## 2025-03-19 DIAGNOSIS — L25.9 CONTACT DERMATITIS, UNSPECIFIED CONTACT DERMATITIS TYPE, UNSPECIFIED TRIGGER: ICD-10-CM

## 2025-03-19 DIAGNOSIS — B07.0 VERRUCA PEDIS: Primary | ICD-10-CM

## 2025-03-19 PROCEDURE — 99213 OFFICE O/P EST LOW 20 MIN: CPT | Mod: S$GLB,,, | Performed by: FAMILY MEDICINE

## 2025-03-19 RX ORDER — MOMETASONE FUROATE 1 MG/G
CREAM TOPICAL DAILY
Qty: 45 G | Refills: 0 | Status: SHIPPED | OUTPATIENT
Start: 2025-03-19

## 2025-03-19 RX ORDER — CEPHALEXIN 500 MG/1
500 CAPSULE ORAL 3 TIMES DAILY
COMMUNITY
Start: 2025-03-15

## 2025-03-19 RX ORDER — FLUCONAZOLE 150 MG/1
150 TABLET ORAL
COMMUNITY
Start: 2025-03-14

## 2025-03-19 NOTE — PROGRESS NOTES
"Subjective:      Patient ID: Roselia Mendez is a 69 y.o. female.    Vitals:  height is 5' 5" (1.651 m) and weight is 55.6 kg (122 lb 9.2 oz). Her oral temperature is 98.3 °F (36.8 °C). Her blood pressure is 128/83 and her pulse is 103. Her respiration is 17 and oxygen saturation is 98%.     Chief Complaint: Mass    Pt presents today w/ mass to side of  rt second toe ; onset approx 2x weeks ago. Pt c/o hardened and irritation of affected area ; denies loss of sensation , trauma , loss of ability to bear full weight and loss of ROM.  Pt tried wart removal patch ;no relief.     Mass  This is a new problem. The current episode started 1 to 4 weeks ago. The problem occurs constantly. The problem has been unchanged. Pertinent negatives include no abdominal pain, anorexia, arthralgias, change in bowel habit, chest pain, chills, congestion, coughing, diaphoresis, fatigue, fever, headaches, joint swelling, myalgias, nausea, neck pain, numbness, rash, sore throat, swollen glands, urinary symptoms, vertigo, visual change, vomiting or weakness. Nothing aggravates the symptoms. Treatments tried: wart removal patch. The treatment provided no relief.       Constitution: Negative for chills, sweating, fatigue and fever.   HENT:  Negative for congestion and sore throat.    Neck: Negative for neck pain.   Cardiovascular:  Negative for chest pain.   Respiratory:  Negative for cough.    Gastrointestinal:  Negative for abdominal pain, nausea and vomiting.   Musculoskeletal:  Negative for joint pain, joint swelling and muscle ache.   Skin:  Positive for erythema. Negative for rash.   Neurological:  Negative for history of vertigo, headaches and numbness.      Objective:     Physical Exam   Constitutional: She does not appear ill. No distress. normal  Cardiovascular: Normal rate, regular rhythm, normal heart sounds and normal pulses.   Pulmonary/Chest: Effort normal and breath sounds normal.   Abdominal: Normal appearance.   Neurological: " "She is alert.   Skin: Skin is warm and rash (2x2 cm area of erythema above achilles right lower extremities with area of excoriations). erythema and lesion (skin tone nodular lesion DIP joint 2nd toe right foot)   Nursing note and vitals reviewed.    Assessment:     1. Verruca pedis    2. Contact dermatitis, unspecified contact dermatitis type, unspecified trigger        Plan:       Verruca pedis    Contact dermatitis, unspecified contact dermatitis type, unspecified trigger  -     mometasone 0.1% (ELOCON) 0.1 % cream; Apply topically once daily.  Dispense: 45 g; Refill: 0    Discussed OTC "freezing" treatments                " X Size Of Lesion In Cm: 0.5 Depth Of Biopsy: dermis Electrodesiccation Text: The wound bed was treated with electrodesiccation after the biopsy was performed. Render In Bullet Format When Appropriate: No Wound Care: Petrolatum Size Of Lesion In Cm: 0.4 Lab Facility: [id number] Additional Anesthesia Volume In Cc (Will Not Render If 0): 0 Cryotherapy Text: The wound bed was treated with cryotherapy after the biopsy was performed. Anesthesia Type: 1% lidocaine with epinephrine Consent: Verbal consent was obtained and risks were reviewed including but not limited to scarring, infection, bleeding, scabbing, incomplete removal, nerve damage and allergy to anesthesia. Lab: [id number] Curettage Text: The wound bed was treated with curettage after the biopsy was performed. Detail Level: Detailed Biopsy Method: double edge Personna blade Notification Instructions: Patient will be notified of biopsy results. However, patient instructed to call the office if not contacted within 2 weeks. Silver Nitrate Text: The wound bed was treated with silver nitrate after the biopsy was performed. Was A Bandage Applied: Yes Hemostasis: Aluminum Chloride Dressing: Band-Aid Post-Care Instructions: I reviewed with the patient in detail post-care instructions. Patient is to keep the biopsy site dry overnight, and then apply vaseline daily until healed. Electrodesiccation And Curettage Text: The wound bed was treated with electrodesiccation and curettage after the biopsy was performed. Type Of Destruction Used: Curettage Biopsy Type: H and E Billing Type: Third-Party Bill

## 2025-07-09 ENCOUNTER — OFFICE VISIT (OUTPATIENT)
Dept: INTERNAL MEDICINE | Facility: CLINIC | Age: 70
End: 2025-07-09
Payer: MEDICARE

## 2025-07-09 ENCOUNTER — LAB VISIT (OUTPATIENT)
Dept: LAB | Facility: OTHER | Age: 70
End: 2025-07-09
Payer: MEDICARE

## 2025-07-09 VITALS
HEIGHT: 63 IN | BODY MASS INDEX: 21.25 KG/M2 | HEART RATE: 111 BPM | SYSTOLIC BLOOD PRESSURE: 134 MMHG | DIASTOLIC BLOOD PRESSURE: 76 MMHG | OXYGEN SATURATION: 98 % | WEIGHT: 119.94 LBS

## 2025-07-09 DIAGNOSIS — Z00.00 ANNUAL PHYSICAL EXAM: ICD-10-CM

## 2025-07-09 DIAGNOSIS — F31.70 BIPOLAR DISORDER IN FULL REMISSION, MOST RECENT EPISODE UNSPECIFIED TYPE: ICD-10-CM

## 2025-07-09 DIAGNOSIS — R10.9 BILATERAL FLANK PAIN: ICD-10-CM

## 2025-07-09 DIAGNOSIS — Z00.00 ANNUAL PHYSICAL EXAM: Primary | ICD-10-CM

## 2025-07-09 DIAGNOSIS — E89.0 POSTABLATIVE HYPOTHYROIDISM: ICD-10-CM

## 2025-07-09 LAB
ABSOLUTE EOSINOPHIL (OHS): 0.11 K/UL
ABSOLUTE MONOCYTE (OHS): 0.56 K/UL (ref 0.3–1)
ABSOLUTE NEUTROPHIL COUNT (OHS): 2.08 K/UL (ref 1.8–7.7)
ALBUMIN SERPL BCP-MCNC: 4.2 G/DL (ref 3.5–5.2)
ALP SERPL-CCNC: 86 UNIT/L (ref 40–150)
ALT SERPL W/O P-5'-P-CCNC: 30 UNIT/L (ref 10–44)
ANION GAP (OHS): 12 MMOL/L (ref 8–16)
AST SERPL-CCNC: 23 UNIT/L (ref 11–45)
BASOPHILS # BLD AUTO: 0.05 K/UL
BASOPHILS NFR BLD AUTO: 1.1 %
BILIRUB SERPL-MCNC: 0.2 MG/DL (ref 0.1–1)
BILIRUB UR QL STRIP.AUTO: NEGATIVE
BUN SERPL-MCNC: 14 MG/DL (ref 8–23)
CALCIUM SERPL-MCNC: 9.8 MG/DL (ref 8.7–10.5)
CHLORIDE SERPL-SCNC: 106 MMOL/L (ref 95–110)
CHOLEST SERPL-MCNC: 173 MG/DL (ref 120–199)
CHOLEST/HDLC SERPL: 2.7 {RATIO} (ref 2–5)
CLARITY UR: CLEAR
CO2 SERPL-SCNC: 23 MMOL/L (ref 23–29)
COLOR UR AUTO: YELLOW
CREAT SERPL-MCNC: 0.7 MG/DL (ref 0.5–1.4)
EAG (OHS): 108 MG/DL (ref 68–131)
ERYTHROCYTE [DISTWIDTH] IN BLOOD BY AUTOMATED COUNT: 12.6 % (ref 11.5–14.5)
GFR SERPLBLD CREATININE-BSD FMLA CKD-EPI: >60 ML/MIN/1.73/M2
GLUCOSE SERPL-MCNC: 101 MG/DL (ref 70–110)
GLUCOSE UR QL STRIP: NEGATIVE
HBA1C MFR BLD: 5.4 % (ref 4–5.6)
HCT VFR BLD AUTO: 41.8 % (ref 37–48.5)
HDLC SERPL-MCNC: 64 MG/DL (ref 40–75)
HDLC SERPL: 37 % (ref 20–50)
HGB BLD-MCNC: 13.5 GM/DL (ref 12–16)
HGB UR QL STRIP: NEGATIVE
IMM GRANULOCYTES # BLD AUTO: 0.01 K/UL (ref 0–0.04)
IMM GRANULOCYTES NFR BLD AUTO: 0.2 % (ref 0–0.5)
KETONES UR QL STRIP: NEGATIVE
LDLC SERPL CALC-MCNC: 92.8 MG/DL (ref 63–159)
LEUKOCYTE ESTERASE UR QL STRIP: NEGATIVE
LITHIUM SERPL-SCNC: 0.3 MMOL/L (ref 0.6–1.2)
LYMPHOCYTES # BLD AUTO: 1.75 K/UL (ref 1–4.8)
MCH RBC QN AUTO: 28.2 PG (ref 27–31)
MCHC RBC AUTO-ENTMCNC: 32.3 G/DL (ref 32–36)
MCV RBC AUTO: 87 FL (ref 82–98)
NITRITE UR QL STRIP: NEGATIVE
NONHDLC SERPL-MCNC: 109 MG/DL
NUCLEATED RBC (/100WBC) (OHS): 0 /100 WBC
PH UR STRIP: 6 [PH]
PLATELET # BLD AUTO: 257 K/UL (ref 150–450)
PMV BLD AUTO: 10.4 FL (ref 9.2–12.9)
POTASSIUM SERPL-SCNC: 4.7 MMOL/L (ref 3.5–5.1)
PROT SERPL-MCNC: 7.4 GM/DL (ref 6–8.4)
PROT UR QL STRIP: NEGATIVE
RBC # BLD AUTO: 4.79 M/UL (ref 4–5.4)
RELATIVE EOSINOPHIL (OHS): 2.4 %
RELATIVE LYMPHOCYTE (OHS): 38.4 % (ref 18–48)
RELATIVE MONOCYTE (OHS): 12.3 % (ref 4–15)
RELATIVE NEUTROPHIL (OHS): 45.6 % (ref 38–73)
SODIUM SERPL-SCNC: 141 MMOL/L (ref 136–145)
SP GR UR STRIP: 1.01
T4 FREE SERPL-MCNC: 1.53 NG/DL (ref 0.71–1.51)
T4 FREE SERPL-MCNC: 1.64 NG/DL (ref 0.71–1.51)
TRIGL SERPL-MCNC: 81 MG/DL (ref 30–150)
TSH SERPL-ACNC: <0.01 UIU/ML (ref 0.4–4)
UROBILINOGEN UR STRIP-ACNC: NEGATIVE EU/DL
WBC # BLD AUTO: 4.56 K/UL (ref 3.9–12.7)

## 2025-07-09 PROCEDURE — 84443 ASSAY THYROID STIM HORMONE: CPT

## 2025-07-09 PROCEDURE — 99204 OFFICE O/P NEW MOD 45 MIN: CPT | Mod: S$PBB,,,

## 2025-07-09 PROCEDURE — 80061 LIPID PANEL: CPT

## 2025-07-09 PROCEDURE — 85025 COMPLETE CBC W/AUTO DIFF WBC: CPT

## 2025-07-09 PROCEDURE — 84439 ASSAY OF FREE THYROXINE: CPT

## 2025-07-09 PROCEDURE — 82947 ASSAY GLUCOSE BLOOD QUANT: CPT

## 2025-07-09 PROCEDURE — 83036 HEMOGLOBIN GLYCOSYLATED A1C: CPT

## 2025-07-09 PROCEDURE — 87086 URINE CULTURE/COLONY COUNT: CPT

## 2025-07-09 PROCEDURE — 99213 OFFICE O/P EST LOW 20 MIN: CPT | Mod: PBBFAC

## 2025-07-09 PROCEDURE — 99999 PR PBB SHADOW E&M-EST. PATIENT-LVL III: CPT | Mod: PBBFAC,,,

## 2025-07-09 PROCEDURE — 81003 URINALYSIS AUTO W/O SCOPE: CPT

## 2025-07-09 PROCEDURE — 80178 ASSAY OF LITHIUM: CPT

## 2025-07-09 PROCEDURE — 36415 COLL VENOUS BLD VENIPUNCTURE: CPT

## 2025-07-09 NOTE — PROGRESS NOTES
"  History & Physical  [facility]      SUBJECTIVE:     History of Present Illness:  Patient is a 70 y.o. female presents to clinic to establish care. Current diagnoses include postablative hypothyroidism and bipolar    Back pain/bladder pain  She reports back pain associated with breathing difficulty. The pain migrates between sides of the back, with progressive worsening throughout the day. She notes decreased ability to take deep breaths compared to earlier in the day. She attempted dietary management with bread and pineapple consumption based on her research.    Hypothyroidism  Status post ablation for abnormal thyroid globus.  She has been on [place] thyroid 60 mcg since that time. She previously discontinued Synthroid due to hair loss in the past.     Bipolar disorder  Has been on lithium 300 b.i.d. and Wellbutrin  mg b.i.d..  Regimen stable and patient with need for lithium lab update today    Last pap smear- Completed 1/2023, NILM  Last mammogram- Completed 4/2025, negative,     Immunizations UTD  Last colonoscopy- Completed in 2023, due 2028  Last HgbA1C- due  Last lipid panel- due  Smoker- Non smoker  EtOH use- No etoh  Drug use- No drug use    Review of patient's allergies indicates:   Allergen Reactions    Promethazine      Psychosis       Past Medical History:   Diagnosis Date    Anxiety     Bipolar 2 disorder     Cancer     Depression     Thyroid disease      Past Surgical History:   Procedure Laterality Date    thyriod      VASCULAR SURGERY       Family History   Problem Relation Name Age of Onset    Cancer Mother  80    Dementia Father  80     Social History[1]     OBJECTIVE:     Vital Signs (Most Recent)  Vitals:    07/09/25 1343   BP: 134/76   BP Location: Left arm   Patient Position: Sitting   Pulse: (!) 111   SpO2: 98%   Weight: 54.4 kg (119 lb 14.9 oz)   Height: 5' 3.19" (1.605 m)         Physical Exam  Constitutional:       General: She is not in acute " distress.     Appearance: Normal appearance. She is not toxic-appearing.   HENT:      Head: Normocephalic and atraumatic.      Right Ear: External ear normal.      Left Ear: External ear normal.      Nose: Nose normal.      Mouth/Throat:      Mouth: Mucous membranes are moist.   Eyes:      Extraocular Movements: Extraocular movements intact.   Cardiovascular:      Rate and Rhythm: Normal rate and regular rhythm.      Pulses: Normal pulses.      Heart sounds: Normal heart sounds.   Pulmonary:      Effort: Pulmonary effort is normal. No respiratory distress.   Abdominal:      General: Abdomen is flat.      Palpations: Abdomen is soft.      Tenderness: There is no abdominal tenderness.   Musculoskeletal:      Cervical back: Normal range of motion and neck supple.   Skin:     General: Skin is warm.      Findings: No bruising or erythema.   Neurological:      General: No focal deficit present.      Mental Status: She is alert.   Psychiatric:         Mood and Affect: Mood normal.           ASSESSMENT/PLAN:   70 y.o.female presents to clinic to establish care.     Considered UTI or kidney stones as potential causes of flank pain and urinary symptoms, noting that UTIs can cause pain, especially in ascending infections, and small kidney stones may pass on their own.  Noted history of frequent UTIs and considered further workup if initial tests are inconclusive.  Acknowledged switch back to [place] Thyroid after adverse effects from Synthroid.  Reviewed preventive care status, noting up-to-date colonoscopy and mammogram.    Roselia was seen today for establish care and annual exam.    Diagnoses and all orders for this visit:    Annual physical exam  -     CBC Auto Differential; Future  -     Comprehensive Metabolic Panel; Future  -     TSH; Future  -     Lipid Panel; Future  -     Hemoglobin A1C; Future    Bilateral flank pain  -     Urine Culture High Risk ($$); Future  -     Urinalysis ($); Future    Bipolar disorder in full  remission, most recent episode unspecified type  -     Lithium Level; Future    Postablative hypothyroidism    PLAN SUMMARY: Prescribed lithium 300 mg twice daily for bipolar disorder management Recommend ibuprofen as needed for pain management Ordered annual labs, including lithium level Ordered urinalysis and urine culture Plan to switch to antibiotics if UTI is confirmed Continue [place] Thyroid medication Patient to contact office with significant findings, particularly urinalysis results Follow dietary recommendations: increase water intake, avoid salty foods and excessive calcium, incorporate lean proteins     Follow-up: 6-12 months or prn for follow up    This note was generated with the assistance of ambient listening technology. Verbal consent was obtained by the patient and accompanying visitor(s) for the recording of patient appointment to facilitate this note. I attest to having reviewed and edited the generated note for accuracy, though some syntax or spelling errors may persist. Please contact the author of this note for any clarification.      Asaf Wahl MD  [facility]             [1]   Social History  Tobacco Use    Smoking status: Never    Smokeless tobacco: Never   Substance Use Topics    Alcohol use: Never    Drug use: No

## 2025-07-11 ENCOUNTER — RESULTS FOLLOW-UP (OUTPATIENT)
Dept: INTERNAL MEDICINE | Facility: CLINIC | Age: 70
End: 2025-07-11
Payer: MEDICARE

## 2025-07-11 DIAGNOSIS — R35.0 URINARY FREQUENCY: Primary | ICD-10-CM

## 2025-07-11 LAB — BACTERIA UR CULT: NORMAL

## 2025-08-02 ENCOUNTER — HOSPITAL ENCOUNTER (EMERGENCY)
Facility: HOSPITAL | Age: 70
Discharge: HOME | End: 2025-08-02
Attending: EMERGENCY MEDICINE
Payer: MEDICARE

## 2025-08-02 VITALS
TEMPERATURE: 98 F | OXYGEN SATURATION: 95 % | DIASTOLIC BLOOD PRESSURE: 73 MMHG | HEIGHT: 64 IN | SYSTOLIC BLOOD PRESSURE: 143 MMHG | WEIGHT: 119 LBS | RESPIRATION RATE: 15 BRPM | BODY MASS INDEX: 20.32 KG/M2 | HEART RATE: 103 BPM

## 2025-08-02 DIAGNOSIS — S01.01XA LACERATION OF SCALP WITHOUT FOREIGN BODY, INITIAL ENCOUNTER: ICD-10-CM

## 2025-08-02 DIAGNOSIS — S09.90XA INJURY OF HEAD, INITIAL ENCOUNTER: ICD-10-CM
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
## 2025-08-02 PROCEDURE — 12032 INTMD RPR S/A/T/EXT 2.6-7.5: CPT

## 2025-08-02 PROCEDURE — 25000003 PHARM REV CODE 250

## 2025-08-02 PROCEDURE — 99284 EMERGENCY DEPT VISIT MOD MDM: CPT | Mod: 25

## 2025-08-02 PROCEDURE — 63600175 PHARM REV CODE 636 W HCPCS

## 2025-08-02 RX ORDER — LIDOCAINE 50 MG/G
1 PATCH TOPICAL DAILY
Qty: 10 PATCH | Refills: 0 | Status: SHIPPED | OUTPATIENT
Start: 2025-08-02

## 2025-08-02 RX ORDER — LIDOCAINE HYDROCHLORIDE 10 MG/ML
5 INJECTION, SOLUTION EPIDURAL; INFILTRATION; INTRACAUDAL; PERINEURAL
Status: COMPLETED | OUTPATIENT
Start: 2025-08-02 | End: 2025-08-02

## 2025-08-02 RX ORDER — ACETAMINOPHEN 500 MG
1000 TABLET ORAL
Status: COMPLETED | OUTPATIENT
Start: 2025-08-02 | End: 2025-08-02

## 2025-08-02 RX ADMIN — ACETAMINOPHEN 1000 MG: 500 TABLET ORAL at 10:08

## 2025-08-02 RX ADMIN — LIDOCAINE HYDROCHLORIDE 50 MG: 10 SOLUTION INTRAVENOUS at 11:08

## 2025-08-12 ENCOUNTER — OFFICE VISIT (OUTPATIENT)
Dept: URGENT CARE | Facility: CLINIC | Age: 70
End: 2025-08-12
Payer: MEDICARE

## 2025-08-12 VITALS
OXYGEN SATURATION: 96 % | HEIGHT: 64 IN | RESPIRATION RATE: 16 BRPM | SYSTOLIC BLOOD PRESSURE: 116 MMHG | HEART RATE: 94 BPM | WEIGHT: 119.06 LBS | DIASTOLIC BLOOD PRESSURE: 75 MMHG | TEMPERATURE: 98 F | BODY MASS INDEX: 20.32 KG/M2

## 2025-08-12 DIAGNOSIS — S01.01XD LACERATION OF SCALP, SUBSEQUENT ENCOUNTER: Primary | ICD-10-CM

## 2025-08-12 DIAGNOSIS — Z48.02: ICD-10-CM

## 2025-08-12 PROCEDURE — 99212 OFFICE O/P EST SF 10 MIN: CPT | Mod: S$GLB,,, | Performed by: NURSE PRACTITIONER

## 2025-08-12 PROCEDURE — 15853 REMOVAL SUTR/STAPL XREQ ANES: CPT | Mod: S$GLB,,, | Performed by: NURSE PRACTITIONER
